# Patient Record
Sex: FEMALE | Race: BLACK OR AFRICAN AMERICAN | NOT HISPANIC OR LATINO | Employment: OTHER | URBAN - METROPOLITAN AREA
[De-identification: names, ages, dates, MRNs, and addresses within clinical notes are randomized per-mention and may not be internally consistent; named-entity substitution may affect disease eponyms.]

---

## 2023-09-06 LAB
ANION GAP IN SER/PLAS: 14 MMOL/L (ref 10–20)
CALCIUM (MG/DL) IN SER/PLAS: 10.2 MG/DL (ref 8.6–10.6)
CARBON DIOXIDE, TOTAL (MMOL/L) IN SER/PLAS: 25 MMOL/L (ref 21–32)
CHLORIDE (MMOL/L) IN SER/PLAS: 109 MMOL/L (ref 98–107)
CREATININE (MG/DL) IN SER/PLAS: 1.48 MG/DL (ref 0.5–1.05)
ESTIMATED AVERAGE GLUCOSE FOR HBA1C: 171 MG/DL
GFR FEMALE: 34 ML/MIN/1.73M2
GLUCOSE (MG/DL) IN SER/PLAS: 155 MG/DL (ref 74–99)
HEMOGLOBIN A1C/HEMOGLOBIN TOTAL IN BLOOD: 7.6 %
POTASSIUM (MMOL/L) IN SER/PLAS: 4 MMOL/L (ref 3.5–5.3)
SODIUM (MMOL/L) IN SER/PLAS: 144 MMOL/L (ref 136–145)
UREA NITROGEN (MG/DL) IN SER/PLAS: 20 MG/DL (ref 6–23)

## 2024-01-04 PROBLEM — J30.9 ALLERGIC RHINITIS: Status: ACTIVE | Noted: 2024-01-04

## 2024-01-04 PROBLEM — R41.89 COGNITIVE IMPAIRMENT: Status: ACTIVE | Noted: 2024-01-04

## 2024-01-04 PROBLEM — T78.02XA ANAPHYLACTIC SHOCK DUE TO SHELLFISH: Status: ACTIVE | Noted: 2024-01-04

## 2024-01-04 PROBLEM — J06.9 ACUTE UPPER RESPIRATORY INFECTION: Status: ACTIVE | Noted: 2024-01-04

## 2024-01-04 PROBLEM — R63.0 ANOREXIA: Status: ACTIVE | Noted: 2024-01-04

## 2024-01-04 PROBLEM — K59.00 CONSTIPATION: Status: ACTIVE | Noted: 2024-01-04

## 2024-01-04 PROBLEM — H52.10 MYOPIA: Status: ACTIVE | Noted: 2024-01-04

## 2024-01-04 PROBLEM — E11.21 CONTROLLED DIABETES MELLITUS WITH DIABETIC NEPHROPATHY (MULTI): Status: ACTIVE | Noted: 2024-01-04

## 2024-01-04 PROBLEM — H52.209 ASTIGMATISM: Status: ACTIVE | Noted: 2024-01-04

## 2024-01-04 PROBLEM — H52.00 HYPEROPIA: Status: ACTIVE | Noted: 2024-01-04

## 2024-01-04 PROBLEM — N18.30 CKD (CHRONIC KIDNEY DISEASE), STAGE III (MULTI): Status: ACTIVE | Noted: 2024-01-04

## 2024-01-04 PROBLEM — F41.9 ANXIETY DISORDER: Status: ACTIVE | Noted: 2024-01-04

## 2024-01-04 PROBLEM — H91.93 BILATERAL HEARING LOSS: Status: ACTIVE | Noted: 2024-01-04

## 2024-01-04 RX ORDER — ACETAMINOPHEN 500 MG
TABLET ORAL 2 TIMES DAILY
COMMUNITY
Start: 2017-06-20

## 2024-01-04 RX ORDER — ATORVASTATIN CALCIUM 10 MG/1
1 TABLET, FILM COATED ORAL DAILY
COMMUNITY
Start: 2014-07-15 | End: 2024-01-22

## 2024-01-04 RX ORDER — GLIPIZIDE 5 MG/1
TABLET ORAL
COMMUNITY
Start: 2016-10-19

## 2024-01-04 RX ORDER — ASCORBIC ACID 500 MG
2000 TABLET ORAL DAILY
COMMUNITY
Start: 2020-09-18

## 2024-01-04 RX ORDER — CLOPIDOGREL BISULFATE 75 MG/1
1 TABLET ORAL DAILY
COMMUNITY
Start: 2014-07-15 | End: 2024-06-03 | Stop reason: SDUPTHER

## 2024-01-04 RX ORDER — EPINEPHRINE 0.3 MG/.3ML
0.3 INJECTION SUBCUTANEOUS
COMMUNITY
Start: 2022-02-09 | End: 2024-01-10 | Stop reason: SDUPTHER

## 2024-01-04 RX ORDER — TURMERIC 400 MG
1 CAPSULE ORAL DAILY
COMMUNITY
Start: 2020-09-18

## 2024-01-04 RX ORDER — VIT C/E/ZN/COPPR/LUTEIN/ZEAXAN 250MG-90MG
50 CAPSULE ORAL DAILY
COMMUNITY
Start: 2014-05-06

## 2024-01-04 RX ORDER — TRAMADOL HYDROCHLORIDE 50 MG/1
1 TABLET ORAL EVERY 8 HOURS PRN
COMMUNITY
Start: 2021-04-30 | End: 2024-01-10 | Stop reason: WASHOUT

## 2024-01-04 RX ORDER — AMLODIPINE BESYLATE 2.5 MG/1
TABLET ORAL
COMMUNITY
Start: 2015-08-04 | End: 2024-01-10 | Stop reason: WASHOUT

## 2024-01-10 ENCOUNTER — OFFICE VISIT (OUTPATIENT)
Dept: GERIATRIC MEDICINE | Facility: CLINIC | Age: 84
End: 2024-01-10
Payer: MEDICARE

## 2024-01-10 VITALS
RESPIRATION RATE: 16 BRPM | OXYGEN SATURATION: 99 % | DIASTOLIC BLOOD PRESSURE: 69 MMHG | SYSTOLIC BLOOD PRESSURE: 112 MMHG | WEIGHT: 135 LBS | HEART RATE: 108 BPM | BODY MASS INDEX: 23.17 KG/M2

## 2024-01-10 DIAGNOSIS — E11.22 TYPE 2 DIABETES MELLITUS WITH STAGE 3B CHRONIC KIDNEY DISEASE, WITHOUT LONG-TERM CURRENT USE OF INSULIN (MULTI): Primary | ICD-10-CM

## 2024-01-10 DIAGNOSIS — I10 PRIMARY HYPERTENSION: ICD-10-CM

## 2024-01-10 DIAGNOSIS — E55.9 VITAMIN D INSUFFICIENCY: ICD-10-CM

## 2024-01-10 DIAGNOSIS — G47.00 INSOMNIA, UNSPECIFIED TYPE: ICD-10-CM

## 2024-01-10 DIAGNOSIS — Z87.892 HISTORY OF ANAPHYLAXIS: ICD-10-CM

## 2024-01-10 DIAGNOSIS — E78.2 MIXED HYPERLIPIDEMIA: ICD-10-CM

## 2024-01-10 DIAGNOSIS — Z86.73 HISTORY OF STROKE: ICD-10-CM

## 2024-01-10 DIAGNOSIS — N18.32 TYPE 2 DIABETES MELLITUS WITH STAGE 3B CHRONIC KIDNEY DISEASE, WITHOUT LONG-TERM CURRENT USE OF INSULIN (MULTI): Primary | ICD-10-CM

## 2024-01-10 DIAGNOSIS — M15.9 PRIMARY OSTEOARTHRITIS INVOLVING MULTIPLE JOINTS: ICD-10-CM

## 2024-01-10 PROBLEM — J06.9 ACUTE UPPER RESPIRATORY INFECTION: Status: RESOLVED | Noted: 2024-01-04 | Resolved: 2024-01-10

## 2024-01-10 PROBLEM — K59.00 CONSTIPATION: Status: RESOLVED | Noted: 2024-01-04 | Resolved: 2024-01-10

## 2024-01-10 LAB
25(OH)D3 SERPL-MCNC: 31 NG/ML (ref 30–100)
ANION GAP SERPL CALC-SCNC: 14 MMOL/L (ref 10–20)
BUN SERPL-MCNC: 15 MG/DL (ref 6–23)
CALCIUM SERPL-MCNC: 10.2 MG/DL (ref 8.6–10.6)
CHLORIDE SERPL-SCNC: 106 MMOL/L (ref 98–107)
CO2 SERPL-SCNC: 26 MMOL/L (ref 21–32)
CREAT SERPL-MCNC: 1.49 MG/DL (ref 0.5–1.05)
EGFRCR SERPLBLD CKD-EPI 2021: 34 ML/MIN/1.73M*2
EST. AVERAGE GLUCOSE BLD GHB EST-MCNC: 171 MG/DL
GLUCOSE SERPL-MCNC: 175 MG/DL (ref 74–99)
HBA1C MFR BLD: 7.6 %
POTASSIUM SERPL-SCNC: 4 MMOL/L (ref 3.5–5.3)
SODIUM SERPL-SCNC: 142 MMOL/L (ref 136–145)

## 2024-01-10 PROCEDURE — 83036 HEMOGLOBIN GLYCOSYLATED A1C: CPT | Performed by: NURSE PRACTITIONER

## 2024-01-10 PROCEDURE — 1159F MED LIST DOCD IN RCRD: CPT | Performed by: NURSE PRACTITIONER

## 2024-01-10 PROCEDURE — 1160F RVW MEDS BY RX/DR IN RCRD: CPT | Performed by: NURSE PRACTITIONER

## 2024-01-10 PROCEDURE — 36415 COLL VENOUS BLD VENIPUNCTURE: CPT | Performed by: NURSE PRACTITIONER

## 2024-01-10 PROCEDURE — 82306 VITAMIN D 25 HYDROXY: CPT | Performed by: NURSE PRACTITIONER

## 2024-01-10 PROCEDURE — 3078F DIAST BP <80 MM HG: CPT | Performed by: NURSE PRACTITIONER

## 2024-01-10 PROCEDURE — 1036F TOBACCO NON-USER: CPT | Performed by: NURSE PRACTITIONER

## 2024-01-10 PROCEDURE — 99215 OFFICE O/P EST HI 40 MIN: CPT | Performed by: NURSE PRACTITIONER

## 2024-01-10 PROCEDURE — 3074F SYST BP LT 130 MM HG: CPT | Performed by: NURSE PRACTITIONER

## 2024-01-10 PROCEDURE — 80048 BASIC METABOLIC PNL TOTAL CA: CPT | Performed by: NURSE PRACTITIONER

## 2024-01-10 PROCEDURE — 1126F AMNT PAIN NOTED NONE PRSNT: CPT | Performed by: NURSE PRACTITIONER

## 2024-01-10 RX ORDER — EPINEPHRINE 0.3 MG/.3ML
0.3 INJECTION SUBCUTANEOUS ONCE AS NEEDED
Qty: 1 EACH | Refills: 3 | Status: SHIPPED | OUTPATIENT
Start: 2024-01-10 | End: 2024-07-08

## 2024-01-10 RX ORDER — BROMELAINS/MELATONIN/HERBAL233 20-1.5-22
1 TABLET,CHEWABLE ORAL NIGHTLY
COMMUNITY
Start: 2023-06-07

## 2024-01-10 ASSESSMENT — ENCOUNTER SYMPTOMS
PALPITATIONS: 0
DEPRESSION: 0
OCCASIONAL FEELINGS OF UNSTEADINESS: 0
DYSURIA: 0
UNEXPECTED WEIGHT CHANGE: 1
TROUBLE SWALLOWING: 0
LOSS OF SENSATION IN FEET: 1
ABDOMINAL PAIN: 0
SLEEP DISTURBANCE: 0
CONSTIPATION: 1
BACK PAIN: 0
ACTIVITY CHANGE: 1
COUGH: 0
WHEEZING: 0
HEADACHES: 0
LIGHT-HEADEDNESS: 0
ARTHRALGIAS: 1
BLOOD IN STOOL: 0
DIZZINESS: 0
DYSPHORIC MOOD: 0
WEAKNESS: 0
SHORTNESS OF BREATH: 0
NERVOUS/ANXIOUS: 0

## 2024-01-10 ASSESSMENT — PAIN SCALES - GENERAL: PAINLEVEL: 0-NO PAIN

## 2024-01-10 ASSESSMENT — PATIENT HEALTH QUESTIONNAIRE - PHQ9
SUM OF ALL RESPONSES TO PHQ9 QUESTIONS 1 AND 2: 0
2. FEELING DOWN, DEPRESSED OR HOPELESS: NOT AT ALL
1. LITTLE INTEREST OR PLEASURE IN DOING THINGS: NOT AT ALL

## 2024-01-10 NOTE — PROGRESS NOTES
Subjective   Ms. Lance is 83 y.o. year old female and here for f/u of   Chief Complaint   Patient presents with    Follow-up    Dm, ckd, htn, hld    Here with self.    Last visit 906  Per pt discussion/summary: note reviewed      HPI   Per patient   - OA really acting up, L knee has been bad, takes tylenol and increases to q8 hours and it helps. When pain is bad, she loses appetite and does not sleep, she thinks she lost wt but not per our records. Declines possible surgery or injections  - toe wound healed now seen by podiatry q3 months, has toe cover but it irritates her toe as does toe separator, uses cotton pad to protect instead.   - BG not tested at home, does not like to test as it brings back bad memories of her son who  from DM  - Wears ID for DM now       Home environment: apt setting in  housing, Mother Deidra Armendariz   Alcohol:No  Smoking:former, quit 13 yrs ago, 50 pack years  Is dependent or requires assistance in the following BADL: none  Is dependent or requires assistance in the following Instrumental ADL: none, not driving, uses community bus for transport and also family     History of Abuse/Neglect/exploitation: none    Advanced Directives on file: has both poa and LW    Review of Systems   Constitutional:  Positive for activity change (due to OA) and unexpected weight change.   HENT:  Negative for dental problem and trouble swallowing.    Respiratory:  Negative for cough, shortness of breath and wheezing.    Cardiovascular:  Negative for chest pain, palpitations and leg swelling.   Gastrointestinal:  Positive for constipation (improving per pt no longer needs miralax or prune juice). Negative for abdominal pain and blood in stool.   Genitourinary:  Negative for dysuria.   Musculoskeletal:  Positive for arthralgias. Negative for back pain (hx of sciatica but stable now).   Skin: Negative.    Neurological:  Negative for dizziness, weakness, light-headedness and headaches.    Psychiatric/Behavioral:  Negative for dysphoric mood and sleep disturbance. The patient is not nervous/anxious.        Objective   /69   Pulse 108   Resp 16   Wt 61.2 kg (135 lb)   SpO2 99%   BMI 23.17 kg/m²   No visits with results within 3 Month(s) from this visit.   Latest known visit with results is:   Orders Only on 09/06/2023   Component Date Value Ref Range Status    Glucose 09/06/2023 155 (H)  74 - 99 mg/dL Final    Sodium 09/06/2023 144  136 - 145 mmol/L Final    Potassium 09/06/2023 4.0  3.5 - 5.3 mmol/L Final    Chloride 09/06/2023 109 (H)  98 - 107 mmol/L Final    Bicarbonate 09/06/2023 25  21 - 32 mmol/L Final    Anion Gap 09/06/2023 14  10 - 20 mmol/L Final    Urea Nitrogen 09/06/2023 20  6 - 23 mg/dL Final    Creatinine 09/06/2023 1.48 (H)  0.50 - 1.05 mg/dL Final    GFR Female 09/06/2023 34 (A)  >90 mL/min/1.73m2 Final    Calcium 09/06/2023 10.2  8.6 - 10.6 mg/dL Final    Hemoglobin A1C 09/06/2023 7.6 (A)  % Final    Estimated Average Glucose 09/06/2023 171  MG/DL Final         Physical Exam  Vitals reviewed.   Constitutional:       Appearance: Normal appearance.      Comments: NAD   HENT:      Head: Normocephalic and atraumatic.      Comments: Hearing adequate for conversation     Mouth/Throat:      Mouth: Mucous membranes are moist.      Pharynx: Oropharynx is clear.   Eyes:      Conjunctiva/sclera: Conjunctivae normal.   Cardiovascular:      Rate and Rhythm: Normal rate and regular rhythm.      Pulses: Normal pulses.      Heart sounds: Normal heart sounds.   Pulmonary:      Effort: Pulmonary effort is normal.      Breath sounds: Normal breath sounds.   Abdominal:      General: Abdomen is flat. Bowel sounds are normal.      Palpations: Abdomen is soft.   Musculoskeletal:         General: Normal range of motion.   Skin:     General: Skin is warm and dry.      Capillary Refill: Capillary refill takes less than 2 seconds.   Neurological:      General: No focal deficit present.      Mental  Status: She is alert and oriented to person, place, and time.   Psychiatric:         Mood and Affect: Mood normal.       Assessment/Plan   1. Type 2 diabetes mellitus with stage 3b chronic kidney disease, without long-term current use of insulin (CMS/[facility])  - Hemoglobin A1C;  - Basic Metabolic Panel  - pt declines to check BG in home as it brings up bad memories of her son    2. Primary hypertension  - stable   - Basic Metabolic Panel; Future    3. History of stroke  - statin and aspirin, plavix continued    4. Mixed hyperlipidemia  - on statin, check lipids next visit     5. Primary osteoarthritis involving multiple joints  - discussed use of tylenol, lido patches and voltaren gel     6. Insomnia, unspecified type  - improved on midnite    7. Vitamin D insufficiency  - Vitamin D 25-Hydroxy,Total (for eval of Vitamin D levels)    8. History of anaphylaxis  - cannot get vaccinations due to hx  - EPINEPHrine 0.3 mg/0.3 mL injection syringe; Inject 0.3 mL (0.3 mg) into the muscle 1 time if needed for anaphylaxis.     9. HM  - cannot do vaccinations  - age out colonoscopy, last randa 2018  - AWV due next appt     Dispo: fu 3 mos for awv and lipid panel

## 2024-01-10 NOTE — PATIENT INSTRUCTIONS
Thank you for meeting with me today. We discussed the following:     Knee pain  - see general pain info below    Diabetes  - we will check your A1c today and see how it is, I would be happy with anything 7 or less.     In addition, here are some general guidelines on brain health, pain control and sleep.   General brain health guidelines:  - Make sure your medical conditions are well controlled (e.g., high blood pressure, high cholesterol, diabetes, sleep apnea etc)  - Do not smoke or chew tobacco  - Limiit alcohol use to no more than 1 alcoholic beverage per day   - Address any sensory deficits (e.g., proper glasses for poor eyesight, hearing aids for hearing loss)  - Use a weekly pill box  - Eat a heart healthy diet (fruits, vegetables, lean meats, fatty fish, whole grains. Limit processed foods)  - Exercise or walk. Gradually increase to the goal of 5 days per week, 30 minutes at a time  - Try to get at least 7 hours of quality sleep per night  - Keep yourself mentally active daily by reading, playing cards, doing word searches, puzzles, etc.  - Challenge your brain with new cognitive tasks (new hobby, crafts, take a class, learn a language)  - Stay socially active by being part of a group or organization    General pain control guidelines  - try to stay ahead of pain by taking your medications sooner rather than later  - Tyelnol is generally a safe medication to take for pain. A general dose is 1000 mg every 6-8 hours; do not exceed 3000 mg or 3 doses in a day. Stay away from formulations that have Benadryl or diphenhydramine in them.   - Lidocaine gel or patch may help to relieve pain. 4% strength is over the counter.  - Voltaren gel 1% may be helpful in relieving pain. It is available over the counter.   - avoid Nonsteroidal Anti-inflammatories (Nsaids) such as Motrin (ibuprofen), Aleve (naproxen) unless specifically recommended by your provider;  these can cause gastrointestinal and kidney problems.   - Use  heat or cold as needed to help with pain.   - Distraction can be helpful.     General sleep guidelines  - Avoid over the counter sleep preparations with diphenhydramine or Benadryl. This medicine can cause confusion and increase risk of falls.   - Do not use alcohol to go to sleep.   - Melatonin is generally safe to try, start with 1-3 mg a couple of hours before sleep.   - Avoid all caffeine after 12 noon. Look for hidden sources such as chocolate.   - Try an herbal tea like chamomile or Sleepytime before bed.  - Turn off the TV or set a timer so it goes off.   - Establish a bedtime routine to tell your body it is time to sleep. It can be some relaxing music, reading a book, taking a shower, prayer/meditation, etc.     Please follow up with us in 3 months for annual wellness visit and regular follow up. We will do a lipid panel that day, so fast   If the weather is bad on the day of your next appointment, it can be changed to a virtual visit. Please call the office on the day of the visit and ask them to change it to a virtual visit.

## 2024-01-20 DIAGNOSIS — E78.5 HYPERLIPIDEMIA, UNSPECIFIED HYPERLIPIDEMIA TYPE: Primary | ICD-10-CM

## 2024-01-22 RX ORDER — ATORVASTATIN CALCIUM 10 MG/1
10 TABLET, FILM COATED ORAL DAILY
Qty: 100 TABLET | Refills: 2 | Status: SHIPPED | OUTPATIENT
Start: 2024-01-22

## 2024-04-10 ENCOUNTER — APPOINTMENT (OUTPATIENT)
Dept: GERIATRIC MEDICINE | Facility: CLINIC | Age: 84
End: 2024-04-10
Payer: MEDICARE

## 2024-04-10 ENCOUNTER — OFFICE VISIT (OUTPATIENT)
Dept: GERIATRIC MEDICINE | Facility: CLINIC | Age: 84
End: 2024-04-10
Payer: MEDICARE

## 2024-04-10 VITALS
HEART RATE: 83 BPM | DIASTOLIC BLOOD PRESSURE: 76 MMHG | WEIGHT: 133.9 LBS | SYSTOLIC BLOOD PRESSURE: 134 MMHG | BODY MASS INDEX: 22.98 KG/M2 | OXYGEN SATURATION: 98 %

## 2024-04-10 DIAGNOSIS — E78.2 MIXED HYPERLIPIDEMIA: ICD-10-CM

## 2024-04-10 DIAGNOSIS — Z86.73 HISTORY OF STROKE: ICD-10-CM

## 2024-04-10 DIAGNOSIS — M15.9 PRIMARY OSTEOARTHRITIS INVOLVING MULTIPLE JOINTS: ICD-10-CM

## 2024-04-10 DIAGNOSIS — I10 PRIMARY HYPERTENSION: ICD-10-CM

## 2024-04-10 DIAGNOSIS — G47.00 INSOMNIA, UNSPECIFIED TYPE: ICD-10-CM

## 2024-04-10 DIAGNOSIS — E11.22 TYPE 2 DIABETES MELLITUS WITH STAGE 3B CHRONIC KIDNEY DISEASE, WITHOUT LONG-TERM CURRENT USE OF INSULIN (MULTI): Primary | ICD-10-CM

## 2024-04-10 DIAGNOSIS — E55.9 VITAMIN D INSUFFICIENCY: ICD-10-CM

## 2024-04-10 DIAGNOSIS — N18.32 TYPE 2 DIABETES MELLITUS WITH STAGE 3B CHRONIC KIDNEY DISEASE, WITHOUT LONG-TERM CURRENT USE OF INSULIN (MULTI): Primary | ICD-10-CM

## 2024-04-10 LAB
CHOLEST SERPL-MCNC: 124 MG/DL (ref 0–199)
CHOLESTEROL/HDL RATIO: 2.2
HDLC SERPL-MCNC: 57.4 MG/DL
LDLC SERPL CALC-MCNC: 51 MG/DL
NON HDL CHOLESTEROL: 67 MG/DL (ref 0–149)
TRIGL SERPL-MCNC: 78 MG/DL (ref 0–149)
VLDL: 16 MG/DL (ref 0–40)

## 2024-04-10 PROCEDURE — 99214 OFFICE O/P EST MOD 30 MIN: CPT | Performed by: NURSE PRACTITIONER

## 2024-04-10 PROCEDURE — 1159F MED LIST DOCD IN RCRD: CPT | Performed by: NURSE PRACTITIONER

## 2024-04-10 PROCEDURE — 99215 OFFICE O/P EST HI 40 MIN: CPT | Performed by: NURSE PRACTITIONER

## 2024-04-10 PROCEDURE — 1170F FXNL STATUS ASSESSED: CPT | Performed by: NURSE PRACTITIONER

## 2024-04-10 PROCEDURE — 1160F RVW MEDS BY RX/DR IN RCRD: CPT | Performed by: NURSE PRACTITIONER

## 2024-04-10 PROCEDURE — 36415 COLL VENOUS BLD VENIPUNCTURE: CPT | Performed by: NURSE PRACTITIONER

## 2024-04-10 PROCEDURE — G0439 PPPS, SUBSEQ VISIT: HCPCS | Performed by: NURSE PRACTITIONER

## 2024-04-10 PROCEDURE — 80061 LIPID PANEL: CPT | Performed by: NURSE PRACTITIONER

## 2024-04-10 PROCEDURE — 3075F SYST BP GE 130 - 139MM HG: CPT | Performed by: NURSE PRACTITIONER

## 2024-04-10 PROCEDURE — 3078F DIAST BP <80 MM HG: CPT | Performed by: NURSE PRACTITIONER

## 2024-04-10 ASSESSMENT — ENCOUNTER SYMPTOMS
COUGH: 0
SLEEP DISTURBANCE: 1
ABDOMINAL PAIN: 0
HEMATURIA: 0
BLOOD IN STOOL: 0
SINUS PAIN: 0
WHEEZING: 0
WEAKNESS: 1
ARTHRALGIAS: 1
DIFFICULTY URINATING: 0
LIGHT-HEADEDNESS: 0
POLYPHAGIA: 0
CHILLS: 0
FREQUENCY: 0
HEADACHES: 0
SINUS PRESSURE: 0
DIZZINESS: 0
CONSTIPATION: 0
OCCASIONAL FEELINGS OF UNSTEADINESS: 0
DYSPHORIC MOOD: 0
FATIGUE: 0
APPETITE CHANGE: 1
FEVER: 0
PALPITATIONS: 0
NUMBNESS: 1
SHORTNESS OF BREATH: 0
TROUBLE SWALLOWING: 0
BACK PAIN: 1
DYSURIA: 0
DEPRESSION: 0
NERVOUS/ANXIOUS: 0
ANAL BLEEDING: 0
LOSS OF SENSATION IN FEET: 0

## 2024-04-10 ASSESSMENT — MONTREAL COGNITIVE ASSESSMENT (MOCA)
8. SERIAL SUBTRACTION OF 7S: 3
WHAT LEVEL OF EDUCATION WAS ATTAINED: 1
12. MEMORY INDEX SCORE: 5
7. [VIGILENCE] TAP WHEN HEARING DESIGNATED LETTER: 1
WHAT IS THE TOTAL SCORE (OUT OF 30): 25
11. FOR EACH PAIR OF WORDS, WHAT CATEGORY DO THEY BELONG TO (OUT OF 2): 2
9. REPEAT EACH SENTENCE: 0
4. NAME EACH OF THE THREE ANIMALS SHOWN: 3
5. MEMORY TRIALS: 0
6. READ LIST OF DIGITS [FORWARD/BACKWARD]: 2
VISUOSPATIAL/EXECUTIVE SUBSCORE: 2
13. ORIENTATION SUBSCORE: 6
10. [FLUENCY] NAME WORDS STARTING WITH DESIGNATED LETTER: 0

## 2024-04-10 ASSESSMENT — ACTIVITIES OF DAILY LIVING (ADL)
HEARING - RIGHT EAR: FUNCTIONAL
STILL_DRIVING: NO
TAKING_MEDICATION: INDEPENDENT
FEEDING YOURSELF: INDEPENDENT
GROOMING: INDEPENDENT
MANAGING_FINANCES: INDEPENDENT
PILL_BOX_USED: YES
DRESSING YOURSELF: INDEPENDENT
NEEDS_ASSISTANCE_WITH_FOOD: INDEPENDENT
USING_TELEPHONE: INDEPENDENT
TOILETING: INDEPENDENT
GROCERY_SHOPPING: INDEPENDENT
JUDGMENT_ADEQUATE_SAFELY_COMPLETE_DAILY_ACTIVITIES: YES
EATING: INDEPENDENT
DOING_HOUSEWORK: INDEPENDENT
HEARING - LEFT EAR: FUNCTIONAL
PATIENT'S MEMORY ADEQUATE TO SAFELY COMPLETE DAILY ACTIVITIES?: YES
WALKS IN HOME: INDEPENDENT
USING_TRANSPORTATION: INDEPENDENT
PREPARING_MEALS: INDEPENDENT
ADEQUATE_TO_COMPLETE_ADL: YES
BATHING: INDEPENDENT

## 2024-04-10 ASSESSMENT — PAIN - FUNCTIONAL ASSESSMENT: PAIN_FUNCTIONAL_ASSESSMENT: 0-10

## 2024-04-10 ASSESSMENT — PATIENT HEALTH QUESTIONNAIRE - PHQ9
SUM OF ALL RESPONSES TO PHQ9 QUESTIONS 1 AND 2: 0
1. LITTLE INTEREST OR PLEASURE IN DOING THINGS: NOT AT ALL
SUM OF ALL RESPONSES TO PHQ9 QUESTIONS 1 AND 2: 0
2. FEELING DOWN, DEPRESSED OR HOPELESS: NOT AT ALL
1. LITTLE INTEREST OR PLEASURE IN DOING THINGS: NOT AT ALL
2. FEELING DOWN, DEPRESSED OR HOPELESS: NOT AT ALL

## 2024-04-10 ASSESSMENT — PAIN SCALES - GENERAL: PAINLEVEL_OUTOF10: 0 - NO PAIN

## 2024-04-10 NOTE — PATIENT INSTRUCTIONS
Thank you for meeting with me today. We discussed the following:     Arthritis  - take 2 tylenol in the morning and 2 at night, take another dose midday if needed. See info below.   - Check into an online guzman chi program. I put in for a PT ven[place], call [phone] and ask for outpatient therapy scheduling.  - go back on the tumeric to help with inflammation.    If appetite is low, try to make sure to get in protein so you don't lose muscle mass.    We did your annual wellness visit  today  What Is a Medicare Annual Wellness Visit?  During your Medicare Annual Wellness visit (AWV), your health care provider will go over your health risks, prevention measures and screenings to make sure all your immunizations, cancer screenings and other health screenings are discussed and scheduled. Your physician will conduct a health risk assessment to help optimize your health and prevent future disease. In addition, the visit gives you an opportunity to ask questions and obtain in-depth information on your health. An AWV is not a traditional physical exam but a time to make sure you are up to date on cancer screening and preventable health conditions, immunizations and any personal health risks.  What Is Included in Your AWV  Health Risk Assessment-Review your blood pressure, heart rate, height, weight and body mass index (BMI)  Patient History Review-Review your current health problems, as well as your medical, surgical, family and social histories.  Review of Current Health Providers-Establish or update a list of your current health care providers.  Medication Review-Review and document current medications, supplements and any medical equipment you use.  Screening & Shots Review-Discuss and schedule preventive screenings and shots recommended for you based on your age, risk factors and family history.  Patient Education-Provide education and referrals based on other components of the visit.  Fall Prevention-Review your functional  ability and level of safety.  End of Life Planning-Discuss wishes and treatment preferences for care if you are unable to make those decisions in the future.  How much does it cost?  You pay nothing for this visit if your doctor or other health care provider accepts assignment.The Part B deductible doesn’t apply. However, you may have to pay coinsurance, and the Part B deductible may apply if your doctor or other health care provider performs additional tests or services during the same visit that Medicare doesn't cover under this preventive benefit.      In addition, here are some general guidelines on brain health, pain control and sleep.   General brain health guidelines:  - Make sure your medical conditions are well controlled (e.g., high blood pressure, high cholesterol, diabetes, sleep apnea etc)  - Do not smoke or chew tobacco  - Limiit alcohol use to no more than 1 alcoholic beverage per day   - Address any sensory deficits (e.g., proper glasses for poor eyesight, hearing aids for hearing loss)  - Use a weekly pill box  - Eat a heart healthy diet (fruits, vegetables, lean meats, fatty fish, whole grains. Limit processed foods)  - Exercise or walk. Gradually increase to the goal of 5 days per week, 30 minutes at a time  - Try to get at least 7 hours of quality sleep per night  - Keep yourself mentally active daily by reading, playing cards, doing word searches, puzzles, etc.  - Challenge your brain with new cognitive tasks (new hobby, crafts, take a class, learn a language)  - Stay socially active by being part of a group or organization    General pain control guidelines  - try to stay ahead of pain by taking your medications sooner rather than later  - Tyelnol is generally a safe medication to take for pain. A general dose is 1000 mg every 6-8 hours; do not exceed 3000 mg or 3 doses in a day. Stay away from formulations that have Benadryl or diphenhydramine in them.   - Lidocaine gel or patch may help to  relieve pain. 4% strength is over the counter.  - Voltaren gel 1% may be helpful in relieving pain. It is available over the counter.   - avoid Nonsteroidal Anti-inflammatories (Nsaids) such as Motrin (ibuprofen), Aleve (naproxen) unless specifically recommended by your provider;  these can cause gastrointestinal and kidney problems.   - Use heat or cold as needed to help with pain.   - Distraction can be helpful.     General sleep guidelines  - Avoid over the counter sleep preparations with diphenhydramine or Benadryl. This medicine can cause confusion and increase risk of falls.   - Do not use alcohol to go to sleep.   - Melatonin is generally safe to try, start with 1-3 mg a couple of hours before sleep.   - Avoid all caffeine after 12 noon. Look for hidden sources such as chocolate.   - Try an herbal tea like chamomile or Sleepytime before bed.  - Turn off the TV or set a timer so it goes off.   - Establish a bedtime routine to tell your body it is time to sleep. It can be some relaxing music, reading a book, taking a shower, prayer/meditation, etc.       Please follow up with us in return to clinic: 3-4 months  I will call you about lab work and the plan.,

## 2024-04-10 NOTE — PROGRESS NOTES
Subjective 1133  Ms. Lance is 83 y.o. year old female and here for f/u of   Chief Complaint   Patient presents with    Follow-up    AWV and  fu DM, htn, hld     Here with self.    Last visit  Per pt discussion/summary: note reviewed    Interim events: none    HPI   Per patient   - OA bothering her,  L knee, Rl eg calf area (steady pain to heel, sharp pain, wakes her sometime) , R shoulder (similar toR leg pain and also travels to L side) hurts with rainy weather, takes hot showers, rubs muscle rub, not sure what is, also tylenol   - pain affecting appetite, drinks sufficient fluids  - cannot do the immunizations due to hx of angioedema with some meds, has been advised not to try immunizations.    Home environment: Sr. Housing apt Mother Ryann Armendariz   Alcohol:No  Smoking:the patient is a former smoker who quit smoking on 13 yrs ago  Is dependent or requires assistance in the following BADL: see screen   Is dependent or requires assistance in the following Instrumental ADL:see screen     History of Abuse/Neglect/exploitation: none    Advanced Directives on file: has both poa and lw    Review of Systems   Constitutional:  Positive for appetite change (pain lessens appetite). Negative for chills, fatigue and fever.   HENT:  Positive for hearing loss. Negative for dental problem, postnasal drip, sinus pressure, sinus pain and trouble swallowing.    Eyes:  Negative for visual disturbance.   Respiratory:  Negative for cough, shortness of breath and wheezing.    Cardiovascular:  Negative for chest pain, palpitations and leg swelling.   Gastrointestinal:  Negative for abdominal pain, anal bleeding, blood in stool and constipation (hx of issues but now resolved).   Endocrine: Negative for polyphagia and polyuria.   Genitourinary:  Negative for difficulty urinating, dysuria, frequency, hematuria, urgency and vaginal bleeding.   Musculoskeletal:  Positive for arthralgias and back pain (hx of sciatica).   Neurological:  Positive  for weakness (more trouble opening cans with R hand) and numbness (R hand numbness and seldom in L). Negative for dizziness, light-headedness and headaches.   Psychiatric/Behavioral:  Positive for sleep disturbance (taking midnite and is sleeping better). Negative for dysphoric mood. The patient is not nervous/anxious.        Objective   /76   Pulse 83   Wt 60.7 kg (133 lb 14.4 oz)   SpO2 98%   BMI 22.98 kg/m²   No visits with results within 3 Month(s) from this visit.   Latest known visit with results is:   Office Visit on 01/10/2024   Component Date Value Ref Range Status    Hemoglobin A1C 01/10/2024 7.6 (H)  see below % Final    Estimated Average Glucose 01/10/2024 171  Not Established mg/dL Final    Glucose 01/10/2024 175 (H)  74 - 99 mg/dL Final    Sodium 01/10/2024 142  136 - 145 mmol/L Final    Potassium 01/10/2024 4.0  3.5 - 5.3 mmol/L Final    Chloride 01/10/2024 106  98 - 107 mmol/L Final    Bicarbonate 01/10/2024 26  21 - 32 mmol/L Final    Anion Gap 01/10/2024 14  10 - 20 mmol/L Final    Urea Nitrogen 01/10/2024 15  6 - 23 mg/dL Final    Creatinine 01/10/2024 1.49 (H)  0.50 - 1.05 mg/dL Final    eGFR 01/10/2024 34 (L)  >60 mL/min/1.73m*2 Final    Calcium 01/10/2024 10.2  8.6 - 10.6 mg/dL Final    Vitamin D, 25-Hydroxy, Total 01/10/2024 31  30 - 100 ng/mL Final         Physical Exam  Constitutional:       General: She is not in acute distress.  HENT:      Mouth/Throat:      Mouth: Mucous membranes are moist.      Pharynx: Oropharynx is clear.   Cardiovascular:      Rate and Rhythm: Regular rhythm.   Pulmonary:      Effort: Pulmonary effort is normal.      Breath sounds: Normal breath sounds.   Abdominal:      General: Bowel sounds are normal.      Palpations: Abdomen is soft.   Musculoskeletal:         General: Normal range of motion.      Cervical back: Normal range of motion and neck supple.      Comments: Neg homans Rcalf,no cordor swellingn    Skin:     General: Skin is warm and dry.       Capillary Refill: Capillary refill takes less than 2 seconds.   Neurological:      Mental Status: She is alert. Mental status is at baseline.   Psychiatric:         Mood and Affect: Mood normal.         Assessment/Plan    1. Type 2 diabetes mellitus with stage 3b chronic kidney disease, without long-term current use of insulin (CMS/[facility])  - last A1c 7.6, pt on glipizide only, not on metformin due to ckd  - does not check BG in home as it brings up bad memories of son   - A1c today, plan to do q3 mos due to not checking at home  - needs additional agent with low potential for hypoglycemia    2. Primary hypertension  3. History of stroke  4. Mixed hyperlipidemia  - on statin and plavix   - last lipid panel 2020, will recheck   - amlodipine     5. Primary osteoarthritis involving multiple joints  - increase tylenol use   - PT eval and fu   - wants to use turmeric     6. Insomnia, unspecified type  - stable no meds     7. Vitamin D insufficiency  - supps      8. HM  - labs needed lipid, A1c  - declines imunizations   - last mammo 2018, opts out of future   - no c scope due to age unless medically indicated     Dispo: will send to therapy for oa pain, fu 3 mos

## 2024-05-06 ENCOUNTER — EVALUATION (OUTPATIENT)
Dept: PHYSICAL THERAPY | Facility: CLINIC | Age: 84
End: 2024-05-06
Payer: MEDICARE

## 2024-05-06 DIAGNOSIS — M15.9 PRIMARY OSTEOARTHRITIS INVOLVING MULTIPLE JOINTS: ICD-10-CM

## 2024-05-06 PROCEDURE — 97161 PT EVAL LOW COMPLEX 20 MIN: CPT | Mod: GP | Performed by: PHYSICAL THERAPIST

## 2024-05-06 PROCEDURE — 97110 THERAPEUTIC EXERCISES: CPT | Mod: GP | Performed by: PHYSICAL THERAPIST

## 2024-05-06 ASSESSMENT — ENCOUNTER SYMPTOMS
LOSS OF SENSATION IN FEET: 0
OCCASIONAL FEELINGS OF UNSTEADINESS: 1
DEPRESSION: 0

## 2024-05-06 NOTE — PROGRESS NOTES
Physical Therapy    Physical Therapy Evaluation and Treatment      Patient Name: Tanya Lance  MRN: [id number]  Today's Date: 5/6/2024  Visit: 1  Insurance: Reviewed  Physician: Lucero Childers/Justine Baumann   PT Evaluation Time Entry  PT Evaluation (Low) Time Entry: 30  PT Therapeutic Procedures Time Entry  Therapeutic Exercise Time Entry: 15  Time Calculation  Start Time: 0845  Stop Time: 0930  Time Calculation (min): 45 min    Assessment: Patient seen in PT for Initial Evaluation for shoulder pain with limited arm mobility, and decreased balance and mobility.   Patient presents with postural deviation  decreased shoulder ROM , decreased shoulder and le strength, shoulder and knee tenderness, gait deviation, and balance deviation.  Functionally, patient  unable to do strenuous ADLs', and decreased endurance to 15 minutes walking.  Patient rates LEFS at 41.25%.    PT Assessment  Rehab Prognosis: Good     Plan:  Continue with POC  Shoulder pulley, shoulder ROM with wand, shoulder strength with theraband, and leg strength, balance and GT, and modalities for pain.    OP PT Plan  PT Plan: Skilled PT  PT Frequency: 1 time per week  Duration: 8  Onset Date: 04/10/25  Certification Period Start Date: 05/06/24  Certification Period End Date: 08/04/24  Rehab Potential: Good  Plan of Care Agreement: Patient    Current Problem:   1. Primary osteoarthritis involving multiple joints  Referral to Physical Therapy    Follow Up In Physical Therapy          Subjective    General: Patient with a history of bilateral arm pain for the last year.  Onset was insidious.  Patient treated with tyelonol - helps.  Patient complains of pain in the region of the bilateral shoulder down lateral arm to forearm, sharp pain, 8/10 at worst last 7 days.  Patient's pain is worse with reaching, lifting arm.  No tests.  Functionally, patient is able to do all ADL's with arm.  Pain also down into right calf, left foot and left knee.  Pain varies with activity.   Patient feels unsteady with gait.  Patient avoids strenuous ADL's.  Limited to 15 minutes walking due to pain due to fatigue.         Precautions: HTN, DM, history of CVA with left leg weakness approx 7 yers ago, fall risk  Precautions  STEADI Fall Risk Score (The score of 4 or more indicates an increased risk of falling): 5  Prior Level of Function: able to do all normal ADL's 10 years ago       Objective     Posture: standing flexed trunk  LE ROM: WNL  LE strength: 4+/5 hip abd, 4+/5 hip add, 4+/5 knee extension, 4/5 knee flexion, 4/5 dorsiflexion, bilaterally   Gait: decreased hs bilaterally  Balance: 10 sec sls on left, 2 sec on right   Postural deviation: FH, rounded shoulders  right Shoulder ROM to 140 flexion, 120 abduction, 70 er, and HBB to TL junction  left Shoulder ROM to 150 flexion, 110 abduction, 70 er, and HBB to T7  right Shoulder strength 3+/5  flex, 3+/5 abduction, 4/5  ER, and 4/5  IR  left Shoulder strength 4/5  flex, 3+/5 abduction, 4/5  ER, and 4/5  IR  Patient tender to palpation at the bilateral UT, left knee and foot    Outcome Measures:  Other Measures  Lower Extremity Funtional Score (LEFS): 41.25%     Treatments:  Patient instructed in HEP including: Codman's exercises 20X each, dips and heel raises 20X.  Instructed in heel to toe with walking, and start walking program.   (15 minutes)    EDUCATION: HEP       Goals:  Active       PT Problem       PT Goal 1       Start:  05/06/24    Expected End:  08/04/24       Normalize gait - good heel strike and toe off         PT Goal 2       Start:  05/06/24    Expected End:  08/04/24       Improve LEFS by 15%         PT Goal 3       Start:  05/06/24    Expected End:  08/04/24       Maximize shoulder ROM          PT Goal 4       Start:  05/06/24    Expected End:  08/04/24       5/5 le strength          PT Goal 5       Start:  05/06/24    Expected End:  08/04/24       SLS 10 sec+

## 2024-05-14 ENCOUNTER — DOCUMENTATION (OUTPATIENT)
Dept: PHYSICAL THERAPY | Facility: CLINIC | Age: 84
End: 2024-05-14
Payer: MEDICARE

## 2024-05-14 ENCOUNTER — APPOINTMENT (OUTPATIENT)
Dept: PHYSICAL THERAPY | Facility: CLINIC | Age: 84
End: 2024-05-14
Payer: MEDICARE

## 2024-05-14 NOTE — PROGRESS NOTES
Physical Therapy                 Therapy Communication Note    Patient Name: Tanya Lance  MRN: [id number]  Today's Date: 5/14/2024     Discipline: Physical Therapy    Missed Visit Reason:  Canceled at 7:51 am, by ES, no reason given.     Missed Time: Cancel    Comment:

## 2024-05-23 RX ORDER — GLIPIZIDE 5 MG/1
5 TABLET ORAL
Refills: 3 | OUTPATIENT
Start: 2024-05-23

## 2024-05-26 DIAGNOSIS — I10 PRIMARY HYPERTENSION: ICD-10-CM

## 2024-05-28 RX ORDER — AMLODIPINE BESYLATE 2.5 MG/1
7.5 TABLET ORAL DAILY
Qty: 270 TABLET | Refills: 3 | Status: SHIPPED | OUTPATIENT
Start: 2024-05-28 | End: 2025-05-28

## 2024-05-31 ENCOUNTER — TREATMENT (OUTPATIENT)
Dept: PHYSICAL THERAPY | Facility: CLINIC | Age: 84
End: 2024-05-31
Payer: MEDICARE

## 2024-05-31 DIAGNOSIS — M15.9 PRIMARY OSTEOARTHRITIS INVOLVING MULTIPLE JOINTS: ICD-10-CM

## 2024-05-31 PROCEDURE — 97110 THERAPEUTIC EXERCISES: CPT | Mod: GP,CQ

## 2024-05-31 ASSESSMENT — PAIN SCALES - GENERAL: PAINLEVEL_OUTOF10: 5 - MODERATE PAIN

## 2024-05-31 ASSESSMENT — PAIN - FUNCTIONAL ASSESSMENT: PAIN_FUNCTIONAL_ASSESSMENT: 0-10

## 2024-05-31 NOTE — PROGRESS NOTES
"Physical Therapy    Physical Therapy Treatment    Patient Name: Tanya Lance  MRN: [id number]  Today's Date: 5/31/2024  Time Calculation  Start Time: 0820  Stop Time: 0915  Time Calculation (min): 55 min     PT Therapeutic Procedures Time Entry  Therapeutic Exercise Time Entry: 55                 Visit 2     Assessment:  PT Assessment  Evaluation/Treatment Tolerance: Patient tolerated treatment well  Positive overall session/response from the patient. Changed from standing calf raise to seated with deficit and weight, no reported issues or pain in the left great toe. Moderate cueing for introduction of resisted strengthening rowing, extensions and bilateral ER; no pains reported. Had soreness during scap/abduction on pulleys and during ball rolls laterally. Fatigue through both legs after 15 repetitions of step ups, no aggravation of symptoms. Leaves today feeling better than when starting.     Plan:   Continue treatment per current POC  Current Problem  1. Primary osteoarthritis involving multiple joints  Follow Up In Physical Therapy      General        Subjective    Mainly having pain through the right shoulder and left knee, reports a pain level around a 5/10 in both. Performing Hep most days; Difficulty when performing calf raises, states that when doing them last she cracked the toenail of the left great toe. No other major updates from the patient currently.     Pain  Pain Assessment  Pain Assessment: 0-10  Pain Score: 5 - Moderate pain    Objective     Treatments: 55 min  Therapeutic Exercise  Therapeutic Exercise Performed: Yes  Recumbent stepper lvl 2.5 x 7 min  Pulley flx/scaption x 5 min  Ball rolls on table fwd/lateral x 20 each  YTB rowing x 20  YTB extensions x 20  Single YTB bilateral ER x 20 (partial rom)  Pendulums PRN for rest breaks (after strengthening)    Seated hip abduction x 20 GT  Seated hip adduction x 20 3 sec hold  6\" step us x 15 each  Lunges on steps x 15 each  Seated calf raise toes " propped on two inch platform 5# db on knees x 30  Through clinic reiterating proper heel toe walking x 3 min    OP EDUCATION:     Goals:  Active       PT Problem       PT Goal 1       Start:  05/06/24    Expected End:  08/04/24       Normalize gait - good heel strike and toe off         PT Goal 2       Start:  05/06/24    Expected End:  08/04/24       Improve LEFS by 15%         PT Goal 3       Start:  05/06/24    Expected End:  08/04/24       Maximize shoulder ROM          PT Goal 4       Start:  05/06/24    Expected End:  08/04/24       5/5 le strength          PT Goal 5       Start:  05/06/24    Expected End:  08/04/24       SLS 10 sec+

## 2024-06-03 DIAGNOSIS — Z86.73 HISTORY OF CVA (CEREBROVASCULAR ACCIDENT): Primary | ICD-10-CM

## 2024-06-03 RX ORDER — CLOPIDOGREL BISULFATE 75 MG/1
75 TABLET ORAL DAILY
Qty: 30 TABLET | Refills: 3 | Status: SHIPPED | OUTPATIENT
Start: 2024-06-03

## 2024-06-04 ENCOUNTER — TREATMENT (OUTPATIENT)
Dept: PHYSICAL THERAPY | Facility: CLINIC | Age: 84
End: 2024-06-04
Payer: MEDICARE

## 2024-06-04 DIAGNOSIS — M15.9 PRIMARY OSTEOARTHRITIS INVOLVING MULTIPLE JOINTS: ICD-10-CM

## 2024-06-04 PROCEDURE — 97110 THERAPEUTIC EXERCISES: CPT | Mod: GP | Performed by: PHYSICAL THERAPIST

## 2024-06-04 NOTE — PROGRESS NOTES
"Physical Therapy    Physical Therapy Treatment    Patient Name: Tanya Lance  MRN: [id number]  Today's Date: 6/4/2024  Visit: 3/8  Insurance: [facility] [employer]  Authorization: 5/6/24-8/6/24     PT Therapeutic Procedures Time Entry  Therapeutic Exercise Time Entry: 40  Time Calculation  Start Time: 0805  Stop Time: 0845  Time Calculation (min): 40 min     PT Therapeutic Procedures Time Entry  Therapeutic Exercise Time Entry: 40                     Assessment: Patient with improved gait with cues for heel to toe walking.  Patient with decreased pain level since starting PT to 3/10.       Plan:   Continue treatment per current POC  Current Problem  1. Primary osteoarthritis involving multiple joints  Follow Up In Physical Therapy      General        Subjective    Knee pain left and right shoulder 3/10 at worst last 2 days  Shoulder pain worse with ROM    Pain       Objective     Normal gait with cues for heel to toe walking    Treatments: 40 min     Recumbent stepper lvl 2.5 x 8 min  Practiced heel to toe gait 20' X 4  Pulley flx/scaption x 5 min  Ball rolls on table fwd/lateral x 20 each  YTB rowing x 20  YTB extensions x 20  Single YTB bilateral ER x 20 (partial rom)  Pendulums PRN for rest breaks (after strengthening)  Mini squats 15X  SLS 10X each  6\" step us x 15 each  Lunges on steps x 20 each  (40 minutes)      OP EDUCATION: HEP      Goals:  Active       PT Problem       PT Goal 1       Start:  05/06/24    Expected End:  08/04/24       Normalize gait - good heel strike and toe off         PT Goal 2       Start:  05/06/24    Expected End:  08/04/24       Improve LEFS by 15%         PT Goal 3       Start:  05/06/24    Expected End:  08/04/24       Maximize shoulder ROM          PT Goal 4       Start:  05/06/24    Expected End:  08/04/24       5/5 le strength          PT Goal 5       Start:  05/06/24    Expected End:  08/04/24       SLS 10 sec+             "

## 2024-06-11 ENCOUNTER — TREATMENT (OUTPATIENT)
Dept: PHYSICAL THERAPY | Facility: CLINIC | Age: 84
End: 2024-06-11
Payer: MEDICARE

## 2024-06-11 DIAGNOSIS — M15.9 PRIMARY OSTEOARTHRITIS INVOLVING MULTIPLE JOINTS: ICD-10-CM

## 2024-06-11 PROCEDURE — 97110 THERAPEUTIC EXERCISES: CPT | Mod: GP | Performed by: PHYSICAL THERAPIST

## 2024-06-11 NOTE — PROGRESS NOTES
"Physical Therapy    Physical Therapy Treatment    Patient Name: Tanya Lance  MRN: [id number]  Today's Date: 6/11/2024  Visit: 4/8  Insurance: [facility] Medicare  Authorization: 5/6/24-8/6/24  PT Therapeutic Procedures Time Entry  Therapeutic Exercise Time Entry: 38  Time Calculation  Start Time: 0845  Stop Time: 0925  Time Calculation (min): 40 min  PT Therapeutic Procedures Time Entry  Therapeutic Exercise Time Entry: 38      Assessment: Patient able to progress exercises without pain or difficulty.        Plan:   Continue treatment per current POC  Current Problem  1. Primary osteoarthritis involving multiple joints  Follow Up In Physical Therapy      General        Subjective    Knee pain left and right shoulder 5/10 at worst last 2 days  Shoulder pain with reaching, knee with sit to stand    Pain       Objective       Treatments: 38 min     Recumbent stepper lvl 2.5 x 7 min  Step stretch 20X  Practiced heel to toe gait 20' X 4  Pulley flx/scaption x 5 min  Ball rolls on table fwd/lateral x 20 each  RTB rowing x 20  RTB extensions x 20  Single YTB bilateral ER x 20 (partial rom)  Mini squats 20X  SLS on blue foam 10X each  6\" step us x 15 each  Supine shoulder press, flies and triceps 2# 10X each  SAQ with 4# 20X   (38 minutes)      OP EDUCATION: HEP      Goals:  Active       PT Problem       PT Goal 1       Start:  05/06/24    Expected End:  08/04/24       Normalize gait - good heel strike and toe off         PT Goal 2       Start:  05/06/24    Expected End:  08/04/24       Improve LEFS by 15%         PT Goal 3       Start:  05/06/24    Expected End:  08/04/24       Maximize shoulder ROM          PT Goal 4       Start:  05/06/24    Expected End:  08/04/24       5/5 le strength          PT Goal 5       Start:  05/06/24    Expected End:  08/04/24       SLS 10 sec+               "

## 2024-06-17 ENCOUNTER — TREATMENT (OUTPATIENT)
Dept: PHYSICAL THERAPY | Facility: CLINIC | Age: 84
End: 2024-06-17
Payer: MEDICARE

## 2024-06-17 DIAGNOSIS — M15.9 PRIMARY OSTEOARTHRITIS INVOLVING MULTIPLE JOINTS: ICD-10-CM

## 2024-06-17 PROCEDURE — 97110 THERAPEUTIC EXERCISES: CPT | Mod: GP,CQ

## 2024-06-17 ASSESSMENT — PAIN - FUNCTIONAL ASSESSMENT: PAIN_FUNCTIONAL_ASSESSMENT: 0-10

## 2024-06-17 ASSESSMENT — PAIN SCALES - GENERAL: PAINLEVEL_OUTOF10: 2

## 2024-06-17 NOTE — PROGRESS NOTES
"Physical Therapy    Physical Therapy Treatment    Patient Name: Tanya Lance  MRN: [id number]  Today's Date: 6/17/2024  Visit: 5/8  Insurance: [employer]  Authorization: 5/6/24-8/6/24  PT Therapeutic Procedures Time Entry  Therapeutic Exercise Time Entry: 45  Time Calculation  Start Time: 0845  Stop Time: 0930  Time Calculation (min): 45 min  PT Therapeutic Procedures Time Entry  Therapeutic Exercise Time Entry: 45      Assessment:  PT Assessment  Evaluation/Treatment Tolerance: Patient tolerated treatment well  Patient performs her repeated exercises this session well without major complaints, added IR and lateral step ups, no issues with IR although stated lateral step ups were aggravating to the left knee, no pain level given. Progressing strength well since last seen by this therapist. Finishes session doing well, no current pain reported. Reviewed IR exercise with handout given/reviewed.     Plan:   Continue treatment per current POC  Current Problem  1. Primary osteoarthritis involving multiple joints  Follow Up In Physical Therapy      General        Subjective    Doing better, knees and shoulder have been less painful the past few days. Doing her HEP daily and increased her walking. Low left knee pain today around 2/10, 2/10 shoulder.     Pain  Pain Assessment  Pain Assessment: 0-10  Pain Score: 2    Objective     Treatments: 40 min  Therapeutic Exercise  Therapeutic Exercise Performed: Yes  Recumbent stepper lvl 2.5 x 7 min  Step stretch 20X  Pulley flx/scaption x 5 min (not today, torn string)  Ball rolls on table fwd/lateral x 20 each  RTB rowing x 20  RTB extensions x 20  Single YTB bilateral ER x 20 (partial rom)  Added RTB IR x 20  Mini squats 20X  SLS on blue foam 10X each  6\" step us x 15 each  Added 6\" lateral step ups x 15 each  Supine shoulder press, flies and triceps 2# 20X each  SAQ with 4# 20X     OP EDUCATION: HEP      Goals:  Active       PT Problem       PT Goal 1       Start:  05/06/24    " Expected End:  08/04/24       Normalize gait - good heel strike and toe off         PT Goal 2       Start:  05/06/24    Expected End:  08/04/24       Improve LEFS by 15%         PT Goal 3       Start:  05/06/24    Expected End:  08/04/24       Maximize shoulder ROM          PT Goal 4       Start:  05/06/24    Expected End:  08/04/24       5/5 le strength          PT Goal 5       Start:  05/06/24    Expected End:  08/04/24       SLS 10 sec+

## 2024-06-24 ENCOUNTER — TREATMENT (OUTPATIENT)
Dept: PHYSICAL THERAPY | Facility: CLINIC | Age: 84
End: 2024-06-24
Payer: MEDICARE

## 2024-06-24 DIAGNOSIS — M15.9 PRIMARY OSTEOARTHRITIS INVOLVING MULTIPLE JOINTS: ICD-10-CM

## 2024-06-24 PROCEDURE — 97110 THERAPEUTIC EXERCISES: CPT | Mod: GP | Performed by: PHYSICAL THERAPIST

## 2024-06-24 NOTE — PROGRESS NOTES
"Physical Therapy    Physical Therapy Treatment    Patient Name: Tanya Lance  MRN: [id number]  Today's Date: 6/24/2024  Visit: 6/8  Insurance: [employer]  Authorization: 5/6/24-8/6/24  PT Therapeutic Procedures Time Entry  Therapeutic Exercise Time Entry: 40  Time Calculation  Start Time: 0840  Stop Time: 0920  Time Calculation (min): 40 min  PT Therapeutic Procedures Time Entry  Therapeutic Exercise Time Entry: 40      Assessment:  Patient with improved gait.  Some increased stiffness this am - but better after doing exercises.      Plan:   Continue treatment per current POC    Current Problem  1. Primary osteoarthritis involving multiple joints  Follow Up In Physical Therapy      General        Subjective    Stiff this morning.   Low left knee pain today around 5/10, 3/10 shoulder.     Pain       Objective     Normal gait     Treatments: 40 min     Recumbent stepper lvl 2.5 x 7 min  Step stretch 20X  Pulley flx/scaption x 5 min (not today, torn string)  Ball rolls on table fwd/lateral x 20 each  RTB rowing x 20  RTB extensions x 20  Single YTB bilateral ER x 20 (partial rom)  Added RTB IR x 20  Mini squats 20X  SLS on blue foam 10X each  6\" step us x 15 each  Added 6\" lateral step ups x 15 each  Supine shoulder press, flies and triceps 3# 10X each  SAQ with 5# 20X   (40 minutes)    OP EDUCATION: HEP      Goals:  Active       PT Problem       PT Goal 1       Start:  05/06/24    Expected End:  08/04/24       Normalize gait - good heel strike and toe off         PT Goal 2       Start:  05/06/24    Expected End:  08/04/24       Improve LEFS by 15%         PT Goal 3       Start:  05/06/24    Expected End:  08/04/24       Maximize shoulder ROM          PT Goal 4       Start:  05/06/24    Expected End:  08/04/24       5/5 le strength          PT Goal 5       Start:  05/06/24    Expected End:  08/04/24       SLS 10 sec+             "

## 2024-07-01 ENCOUNTER — TREATMENT (OUTPATIENT)
Dept: PHYSICAL THERAPY | Facility: CLINIC | Age: 84
End: 2024-07-01
Payer: MEDICARE

## 2024-07-01 DIAGNOSIS — M15.9 PRIMARY OSTEOARTHRITIS INVOLVING MULTIPLE JOINTS: ICD-10-CM

## 2024-07-01 PROCEDURE — 97110 THERAPEUTIC EXERCISES: CPT | Mod: GP | Performed by: PHYSICAL THERAPIST

## 2024-07-01 NOTE — PROGRESS NOTES
"Physical Therapy    Physical Therapy Treatment    Patient Name: Tanya Lance  MRN: [id number]  Today's Date: 7/1/2024  Visit: 7/8  Insurance: [facility] Medicare  Authorization: 5/6/24-8/6/24  PT Therapeutic Procedures Time Entry  Therapeutic Exercise Time Entry: 40  Time Calculation  Start Time: 0845  Stop Time: 0925  Time Calculation (min): 40 min  PT Therapeutic Procedures Time Entry  Therapeutic Exercise Time Entry: 40      Assessment:  Patient with flare up of symptoms this weekend which patient relates to change in weather.  Better with heat, tyelonol, rest and gentle exercises.        Plan:   Continue treatment per current POC  Reassess next visit     Current Problem  1. Primary osteoarthritis involving multiple joints  Follow Up In Physical Therapy      General        Subjective      left knee and shoulder pain today around 10/10, 10/10 on Saturday - relates to the weather    Pain       Objective         Treatments: 40 min   Pulley's for shoulder elevation 5 minutes   Step stretch 20X  Ball rolls on table fwd/lateral x 20 each  Standing curls, triceps, shoulder abd with 1# 20X   RTB rowing x 20  RTB extensions x 20  Single YTB bilateral ER x 20 (partial rom)  Added RTB IR x 20  Mini squats 20X  SLS on blue foam 10X each  6\" step us x 15 each  Supine shoulder press, flies and triceps 3# 10X each  SAQ with 5# 20X   Recumbent stepper lvl 2.5 x 5 minutes   (40 minutes)    OP EDUCATION: HEP      Goals:  Active       PT Problem       PT Goal 1       Start:  05/06/24    Expected End:  08/04/24       Normalize gait - good heel strike and toe off         PT Goal 2       Start:  05/06/24    Expected End:  08/04/24       Improve LEFS by 15%         PT Goal 3       Start:  05/06/24    Expected End:  08/04/24       Maximize shoulder ROM          PT Goal 4       Start:  05/06/24    Expected End:  08/04/24       5/5 le strength          PT Goal 5       Start:  05/06/24    Expected End:  08/04/24       SLS 10 sec+               "

## 2024-07-10 ENCOUNTER — OFFICE VISIT (OUTPATIENT)
Dept: GERIATRIC MEDICINE | Facility: CLINIC | Age: 84
End: 2024-07-10
Payer: MEDICARE

## 2024-07-10 VITALS
TEMPERATURE: 97 F | SYSTOLIC BLOOD PRESSURE: 125 MMHG | WEIGHT: 133.7 LBS | DIASTOLIC BLOOD PRESSURE: 69 MMHG | HEART RATE: 106 BPM | BODY MASS INDEX: 22.95 KG/M2 | RESPIRATION RATE: 18 BRPM

## 2024-07-10 DIAGNOSIS — N18.32 TYPE 2 DIABETES MELLITUS WITH STAGE 3B CHRONIC KIDNEY DISEASE, WITHOUT LONG-TERM CURRENT USE OF INSULIN (MULTI): ICD-10-CM

## 2024-07-10 DIAGNOSIS — E11.22 TYPE 2 DIABETES MELLITUS WITH STAGE 3B CHRONIC KIDNEY DISEASE, WITHOUT LONG-TERM CURRENT USE OF INSULIN (MULTI): ICD-10-CM

## 2024-07-10 LAB
ANION GAP SERPL CALC-SCNC: 13 MMOL/L (ref 10–20)
BUN SERPL-MCNC: 20 MG/DL (ref 6–23)
CALCIUM SERPL-MCNC: 9.8 MG/DL (ref 8.6–10.6)
CHLORIDE SERPL-SCNC: 107 MMOL/L (ref 98–107)
CO2 SERPL-SCNC: 27 MMOL/L (ref 21–32)
CREAT SERPL-MCNC: 1.24 MG/DL (ref 0.5–1.05)
EGFRCR SERPLBLD CKD-EPI 2021: 42 ML/MIN/1.73M*2
GLUCOSE SERPL-MCNC: 121 MG/DL (ref 74–99)
POTASSIUM SERPL-SCNC: 4.1 MMOL/L (ref 3.5–5.3)
SODIUM SERPL-SCNC: 143 MMOL/L (ref 136–145)

## 2024-07-10 PROCEDURE — 1159F MED LIST DOCD IN RCRD: CPT | Performed by: NURSE PRACTITIONER

## 2024-07-10 PROCEDURE — 1036F TOBACCO NON-USER: CPT | Performed by: NURSE PRACTITIONER

## 2024-07-10 PROCEDURE — 99215 OFFICE O/P EST HI 40 MIN: CPT | Performed by: NURSE PRACTITIONER

## 2024-07-10 PROCEDURE — 1125F AMNT PAIN NOTED PAIN PRSNT: CPT | Performed by: NURSE PRACTITIONER

## 2024-07-10 PROCEDURE — 36415 COLL VENOUS BLD VENIPUNCTURE: CPT | Performed by: NURSE PRACTITIONER

## 2024-07-10 PROCEDURE — 1160F RVW MEDS BY RX/DR IN RCRD: CPT | Performed by: NURSE PRACTITIONER

## 2024-07-10 PROCEDURE — 80048 BASIC METABOLIC PNL TOTAL CA: CPT | Performed by: NURSE PRACTITIONER

## 2024-07-10 PROCEDURE — 83036 HEMOGLOBIN GLYCOSYLATED A1C: CPT | Performed by: NURSE PRACTITIONER

## 2024-07-10 ASSESSMENT — ENCOUNTER SYMPTOMS
DYSURIA: 0
SHORTNESS OF BREATH: 0
DYSPHORIC MOOD: 0
TROUBLE SWALLOWING: 0
DIZZINESS: 0
ABDOMINAL PAIN: 0
LOSS OF SENSATION IN FEET: 0
UNEXPECTED WEIGHT CHANGE: 1
ARTHRALGIAS: 1
CONSTIPATION: 1
WHEEZING: 0
NERVOUS/ANXIOUS: 0
DIFFICULTY URINATING: 0
APPETITE CHANGE: 1
LIGHT-HEADEDNESS: 0
SLEEP DISTURBANCE: 0
HEADACHES: 0
COUGH: 0
OCCASIONAL FEELINGS OF UNSTEADINESS: 0
PALPITATIONS: 0

## 2024-07-10 ASSESSMENT — PATIENT HEALTH QUESTIONNAIRE - PHQ9
2. FEELING DOWN, DEPRESSED OR HOPELESS: NOT AT ALL
SUM OF ALL RESPONSES TO PHQ9 QUESTIONS 1 AND 2: 0
1. LITTLE INTEREST OR PLEASURE IN DOING THINGS: NOT AT ALL

## 2024-07-10 ASSESSMENT — PAIN SCALES - GENERAL: PAINLEVEL: 6

## 2024-07-10 NOTE — PROGRESS NOTES
Subjective   Ms. Lance is 84 y.o. year old female and here for f/u of   Chief Complaint   Patient presents with    Follow-up    HTN, DM    Here with self.    Last visit 041024  Per pt discussion/summary: note reviewedwill send to therapy for oa pain, fu 3 mos     Interim events: none     HPI   Per patient   - PT has helped with her pain, bothers her still with rain  - went to podiatry, nails are brittle and cracking-planning to take a supplement from podiatry to help with this.   - family worried about eating says she is losing wt, but she is stable per our  scale   - staying up late to watch the news  -has never had to use an epipen but has avail at pharm prn     Review of Systems   Constitutional:  Positive for appetite change (food  just doesnot taste the same and does not like to cook) and unexpected weight change (see hpi).   HENT:  Negative for dental problem and trouble swallowing.    Respiratory:  Negative for cough, shortness of breath and wheezing.    Cardiovascular:  Negative for chest pain, palpitations and leg swelling.   Gastrointestinal:  Positive for constipation (has not neededmiralax recently). Negative for abdominal pain.   Genitourinary:  Negative for difficulty urinating and dysuria.   Musculoskeletal:  Positive for arthralgias (L knee improved with therapy also R shoulder pain).   Skin:         Thinksshe has eczema on wrists, not open, no itching    Neurological:  Negative for dizziness, light-headedness and headaches.   Psychiatric/Behavioral:  Negative for dysphoric mood and sleep disturbance. The patient is not nervous/anxious.        Objective   /69   Pulse 106   Temp 36.1 °C (97 °F) (Tympanic)   Resp 18   Wt 60.6 kg (133 lb 11.2 oz)   BMI 22.95 kg/m²   MoCA:  /30  No visits with results within 3 Month(s) from this visit.   Latest known visit with results is:   Office Visit on 04/10/2024   Component Date Value Ref Range Status    Cholesterol 04/10/2024 124  0 - 199 mg/dL Final     HDL-Cholesterol 04/10/2024 57.4  mg/dL Final    Cholesterol/HDL Ratio 04/10/2024 2.2   Final    LDL Calculated 04/10/2024 51  <=99 mg/dL Final    VLDL 04/10/2024 16  0 - 40 mg/dL Final    Triglycerides 04/10/2024 78  0 - 149 mg/dL Final    Non HDL Cholesterol 04/10/2024 67  0 - 149 mg/dL Final         Physical Exam  Vitals reviewed.   Constitutional:       Appearance: Normal appearance.      Comments: NAD   HENT:      Head: Normocephalic and atraumatic.      Comments: Hearing adequate for conversation     Nose: Nose normal.      Mouth/Throat:      Mouth: Mucous membranes are moist.      Pharynx: Oropharynx is clear.   Eyes:      Conjunctiva/sclera: Conjunctivae normal.   Cardiovascular:      Rate and Rhythm: Regular rhythm. Tachycardia present.      Pulses: Normal pulses.      Heart sounds: Normal heart sounds.   Pulmonary:      Effort: Pulmonary effort is normal.      Breath sounds: Normal breath sounds.   Abdominal:      General: Abdomen is flat. Bowel sounds are normal.      Palpations: Abdomen is soft.   Musculoskeletal:         General: Normal range of motion.      Cervical back: Normal range of motion and neck supple.   Skin:     General: Skin is warm and dry.      Capillary Refill: Capillary refill takes less than 2 seconds.      Comments: Skin changes on bilat wrists medial aspect, no open areas. No itching    Neurological:      General: No focal deficit present.      Mental Status: She is alert and oriented to person, place, and time.   Psychiatric:         Mood and Affect: Mood normal.       Assessment/Plan     Tachycardia  - 106 today, has been high in past as well, RRR and asymptomatic  - may be related to fluid intake, pt to drink more    Appetite changes  - instruct to eat protein daily   - monitor wts, pt has not lost per our scale but family is concerned  - fu 3 mos    Type 2 diabetes mellitus with stage 3b chronic kidney disease, without long-term current use of insulin (CMS/[facility])  - last A1c 7.6, pt  on glipizide only, not on metformin due to ckd  - does not check BG in home as it brings up bad memories of son   - A1c today, plan to do q3 mos due to not checking at home  - consider additional agent with low potential for hypoglycemia perhaps [id number] although renal status will also dictate type     Primary hypertension  History of stroke   Mixed hyperlipidemia  - on statin and plavix , continue same with hx of stroke   - last lipid panel 311004 all wnl   - amlodipine     CKD 3b  - stable pe last labs, will continue to monitor labs today   - meds reviewed    primary osteoarthritis involving multiple joints  - increase tylenol use   - PT helping  - uses turmeric     Insomnia, unspecified type  - stable uses mid nite prn, has been staying up late to watch news, she will try to go to bed earlier    Vitamin D insufficiency  - supps       HM  - labs needed A1c, bmp  - declines immunizations due to hx of reactions   - last mammo 2018, opts out of future   - no c scope due to age unless medically indicated   - AWV due on or after [id number]    Dispo: tachy today but may be underhydrated, asymptomatic and instructed on need to call if sx arise. Check labs, probably needs additonal agent for glucose but w/o checking BG at home, concerned about potential for low blood sugar, will need to discuss next visit. Monitor wts with decreased appetite.

## 2024-07-10 NOTE — PATIENT INSTRUCTIONS
Thank you for meeting with me today. We discussed the following:   Heart rate is a little elevated, drink more water you might be dehydrated.   Call if you get any chest pain or short of breath.   Try to eat protein daily, 40-60 G a day is needed.   We will check labs today.     In addition, here are some general guidelines on brain health, pain control and sleep.   General brain health guidelines:  - Make sure your medical conditions are well controlled (e.g., high blood pressure, high cholesterol, diabetes, sleep apnea etc)  - Do not smoke or chew tobacco  - Limiit alcohol use to no more than 1 alcoholic beverage per day   - Address any sensory deficits (e.g., proper glasses for poor eyesight, hearing aids for hearing loss)  - Use a weekly pill box  - Eat a heart healthy diet (fruits, vegetables, lean meats, fatty fish, whole grains. Limit processed foods)  - Exercise or walk. Gradually increase to the goal of 5 days per week, 30 minutes at a time  - Try to get at least 7 hours of quality sleep per night  - Keep yourself mentally active daily by reading, playing cards, doing word searches, puzzles, etc.  - Challenge your brain with new cognitive tasks (new hobby, crafts, take a class, learn a language)  - Stay socially active by being part of a group or organization    General pain control guidelines  - try to stay ahead of pain by taking your medications sooner rather than later  - Tyelnol is generally a safe medication to take for pain. A general dose is 1000 mg every 6-8 hours; do not exceed 3000 mg or 3 doses in a day. Stay away from formulations that have Benadryl or diphenhydramine in them.   - Lidocaine gel or patch may help to relieve pain. 4% strength is over the counter.  - Voltaren gel 1% may be helpful in relieving pain. It is available over the counter.   - avoid Nonsteroidal Anti-inflammatories (Nsaids) such as Motrin (ibuprofen), Aleve (naproxen) unless specifically recommended by your provider;   these can cause gastrointestinal and kidney problems.   - Use heat or cold as needed to help with pain.   - Distraction can be helpful.     General sleep guidelines  - Avoid over the counter sleep preparations with diphenhydramine or Benadryl. This medicine can cause confusion and increase risk of falls.   - Do not use alcohol to go to sleep.   - Melatonin is generally safe to try, start with 1-3 mg a couple of hours before sleep.   - Avoid all caffeine after 12 noon. Look for hidden sources such as chocolate.   - Try an herbal tea like chamomile or Sleepytime before bed.  - Turn off the TV or set a timer so it goes off.   - Establish a bedtime routine to tell your body it is time to sleep. It can be some relaxing music, reading a book, taking a shower, prayer/meditation, etc.       Please follow up with us in return to clinic: 3-4 months. Please bring all medications to follow up appointments.   If the weather is bad on the day of your next appointment, it can be changed to a virtual visit. Please call the office on the day of the visit and ask them to change it to a virtual visit.

## 2024-07-11 LAB
EST. AVERAGE GLUCOSE BLD GHB EST-MCNC: 160 MG/DL
HBA1C MFR BLD: 7.2 %

## 2024-07-11 NOTE — RESULT ENCOUNTER NOTE
A1c  better, 7.2 now down from 7.6 but still a little high, I would like under 7. We will go up on her glipizide to 7.5 mg in the mornings, that would be 1.5 tabs of her current dose (which is 5mg) and keep the same dose of 10 mg in the afternoon.  Watch for sx of hypoglycemia and if occur, call us. Kidneys show some mild improvement.  Can check all again in about 3 mos. [place]

## 2024-07-18 ENCOUNTER — TREATMENT (OUTPATIENT)
Dept: PHYSICAL THERAPY | Facility: CLINIC | Age: 84
End: 2024-07-18
Payer: MEDICARE

## 2024-07-18 DIAGNOSIS — M15.9 PRIMARY OSTEOARTHRITIS INVOLVING MULTIPLE JOINTS: ICD-10-CM

## 2024-07-18 PROCEDURE — 97110 THERAPEUTIC EXERCISES: CPT | Mod: GP | Performed by: PHYSICAL THERAPIST

## 2024-07-18 NOTE — PROGRESS NOTES
"Physical Therapy    Physical Therapy Treatment    Patient Name: Tanya Lance  MRN: [id number]  Today's Date: 7/18/2024  Visit: 8/8  Insurance: [facility] Medicare  Authorization: 5/6/24-8/6/24  PT Therapeutic Procedures Time Entry  Therapeutic Exercise Time Entry: 40  Time Calculation  Start Time: 0845  Stop Time: 0925  Time Calculation (min): 40 min  PT Therapeutic Procedures Time Entry  Therapeutic Exercise Time Entry: 40      Assessment:  Patient seen in PT for 8 visits for primary oa of multiple joints.  Patient with good progress towards PT goals including:   improved gait with improved heel strike and toe off, improved LEFS to 76.25%, improved shoulder ROM, 4+/5 knee strength, and improved SLS to 10 sec+.  Discharge to HEP.       Plan:  Discharge to HEP.        Current Problem  1. Primary osteoarthritis involving multiple joints  Follow Up In Physical Therapy      General        Subjective    Knee pain 7/10 at worst last 2 days  Shoulder pain 8/10 at worst last 2 days   Less knee and shoulder pain and improved mobility  Able to walk in building 20 minutes    Pain       Objective     Normal gait   SLS 10 sec   Right Shoulder ROM elevation 145, er 75, HBB to T5  Left knee strength 4+/5    Treatments: 40 min  Recumbent stepper lvl 2.5 x 5 minutes   Step stretch 20X  SLS 5X each  Ball rolls on table fwd/lateral x 20 each  Shoulder pulleys 5 minutes  Mini squats 20X  6\" step us x 15 each  SAQ with 5# 20X   (40 minutes)    OP EDUCATION: HEP      Goals:  Active       PT Problem       PT Goal 1       Start:  05/06/24    Expected End:  08/04/24       Normalize gait - good heel strike and toe off         PT Goal 2       Start:  05/06/24    Expected End:  08/04/24       Improve LEFS by 15%         PT Goal 3       Start:  05/06/24    Expected End:  08/04/24       Maximize shoulder ROM          PT Goal 4       Start:  05/06/24    Expected End:  08/04/24       5/5 le strength          PT Goal 5       Start:  05/06/24    Expected " End:  08/04/24       SLS 10 sec+

## 2024-08-10 ENCOUNTER — APPOINTMENT (OUTPATIENT)
Dept: RADIOLOGY | Facility: HOSPITAL | Age: 84
End: 2024-08-10
Payer: MEDICARE

## 2024-08-10 ENCOUNTER — HOSPITAL ENCOUNTER (EMERGENCY)
Facility: HOSPITAL | Age: 84
Discharge: HOME | End: 2024-08-11
Attending: INTERNAL MEDICINE
Payer: MEDICARE

## 2024-08-10 VITALS
HEIGHT: 64 IN | OXYGEN SATURATION: 98 % | RESPIRATION RATE: 18 BRPM | TEMPERATURE: 97.8 F | WEIGHT: 140 LBS | DIASTOLIC BLOOD PRESSURE: 67 MMHG | HEART RATE: 79 BPM | BODY MASS INDEX: 23.9 KG/M2 | SYSTOLIC BLOOD PRESSURE: 137 MMHG
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
## 2024-08-10 DIAGNOSIS — M25.562 ACUTE PAIN OF LEFT KNEE: Primary | ICD-10-CM

## 2024-08-10 DIAGNOSIS — S60.512A ABRASION OF LEFT HAND, INITIAL ENCOUNTER: ICD-10-CM

## 2024-08-10 DIAGNOSIS — S00.83XA CONTUSION OF CHIN, INITIAL ENCOUNTER: ICD-10-CM

## 2024-08-10 PROCEDURE — 72125 CT NECK SPINE W/O DYE: CPT

## 2024-08-10 PROCEDURE — 73130 X-RAY EXAM OF HAND: CPT | Mod: LT

## 2024-08-10 PROCEDURE — 2500000001 HC RX 250 WO HCPCS SELF ADMINISTERED DRUGS (ALT 637 FOR MEDICARE OP): Performed by: PHARMACIST

## 2024-08-10 PROCEDURE — 70486 CT MAXILLOFACIAL W/O DYE: CPT

## 2024-08-10 PROCEDURE — 73564 X-RAY EXAM KNEE 4 OR MORE: CPT | Mod: LT

## 2024-08-10 PROCEDURE — 70450 CT HEAD/BRAIN W/O DYE: CPT | Performed by: RADIOLOGY

## 2024-08-10 PROCEDURE — 76377 3D RENDER W/INTRP POSTPROCES: CPT | Performed by: RADIOLOGY

## 2024-08-10 PROCEDURE — 76377 3D RENDER W/INTRP POSTPROCES: CPT

## 2024-08-10 PROCEDURE — 73130 X-RAY EXAM OF HAND: CPT | Mod: LEFT SIDE | Performed by: RADIOLOGY

## 2024-08-10 PROCEDURE — 72125 CT NECK SPINE W/O DYE: CPT | Performed by: RADIOLOGY

## 2024-08-10 PROCEDURE — 70486 CT MAXILLOFACIAL W/O DYE: CPT | Performed by: RADIOLOGY

## 2024-08-10 PROCEDURE — 99285 EMERGENCY DEPT VISIT HI MDM: CPT | Mod: 25

## 2024-08-10 PROCEDURE — 70450 CT HEAD/BRAIN W/O DYE: CPT

## 2024-08-10 PROCEDURE — 73564 X-RAY EXAM KNEE 4 OR MORE: CPT | Mod: LEFT SIDE | Performed by: RADIOLOGY

## 2024-08-10 RX ORDER — ACETAMINOPHEN 325 MG/1
650 TABLET ORAL ONCE
Status: COMPLETED | OUTPATIENT
Start: 2024-08-10 | End: 2024-08-10

## 2024-08-10 RX ORDER — PETROLATUM 420 MG/G
OINTMENT TOPICAL ONCE
Status: DISCONTINUED | OUTPATIENT
Start: 2024-08-10 | End: 2024-08-10 | Stop reason: CLARIF

## 2024-08-10 RX ADMIN — ACETAMINOPHEN 650 MG: 325 TABLET ORAL at 22:15

## 2024-08-10 RX ADMIN — SKIN PROTECTANT: 33 OINTMENT TOPICAL at 23:59

## 2024-08-10 ASSESSMENT — COLUMBIA-SUICIDE SEVERITY RATING SCALE - C-SSRS
6. HAVE YOU EVER DONE ANYTHING, STARTED TO DO ANYTHING, OR PREPARED TO DO ANYTHING TO END YOUR LIFE?: NO
1. IN THE PAST MONTH, HAVE YOU WISHED YOU WERE DEAD OR WISHED YOU COULD GO TO SLEEP AND NOT WAKE UP?: NO
2. HAVE YOU ACTUALLY HAD ANY THOUGHTS OF KILLING YOURSELF?: NO

## 2024-08-10 ASSESSMENT — PAIN DESCRIPTION - FREQUENCY: FREQUENCY: CONSTANT/CONTINUOUS

## 2024-08-10 ASSESSMENT — PAIN DESCRIPTION - ORIENTATION
ORIENTATION_2: LEFT
ORIENTATION_3: LEFT
ORIENTATION: LEFT

## 2024-08-10 ASSESSMENT — PAIN SCALES - GENERAL
PAINLEVEL_OUTOF10: 9
PAINLEVEL_OUTOF10: 7

## 2024-08-10 ASSESSMENT — PAIN - FUNCTIONAL ASSESSMENT: PAIN_FUNCTIONAL_ASSESSMENT: 0-10

## 2024-08-10 ASSESSMENT — PAIN DESCRIPTION - DESCRIPTORS
DESCRIPTORS_2: ACHING
DESCRIPTORS_3: ACHING
DESCRIPTORS: ACHING

## 2024-08-10 ASSESSMENT — PAIN DESCRIPTION - LOCATION
LOCATION_3: KNEE
LOCATION_2: HAND
LOCATION: FACE

## 2024-08-10 ASSESSMENT — PAIN DESCRIPTION - PAIN TYPE: TYPE: ACUTE PAIN

## 2024-08-11 DIAGNOSIS — Z86.73 HISTORY OF CVA (CEREBROVASCULAR ACCIDENT): ICD-10-CM

## 2024-08-11 NOTE — DISCHARGE INSTRUCTIONS
You were seen today for a mechanical fall.  You have a little fluid on your left knee which may suggest an underlying ligamentous or meniscal injury or swelling from underlying arthritis.  We placed an Ace wrap to help with swelling and stability.  You can also buy an over-the-counter knee brace to help with stability.  You can take Tylenol as needed for pain.  Apply ice as needed for swelling. Follow-up with orthopedics as needed for persistent knee pain.  You have a bruise to your chin and scrapes to your hand but no bony injury.  We cleaned your wounds and you can help them heal by applying bacitracin or Vaseline ointment.  Your evaluation was not concerning for an emergency at this time. Please see the attached information sheet for information about your condition, how to care for your condition at home, and reasons to return to the emergency department. Take any prescriptions written today as prescribed. You should call your primary care provider within 24 hours to tell them about today's visit, including any new medications or medication changes, as he or she may want to see you in the office for further evaluation. If you do not have a primary care provider, call  [phone] for an appointment. We offer in-person office visits as well as virtual options. Please do not hesitate to call  [phone] or return to the emergency department with any new or unresolved concerns or symptoms. Thank you for choosing [facility] for your care.

## 2024-08-11 NOTE — ED PROVIDER NOTES
HPI     CC: Fall and Head Injury     HPI: Tanya Lance is a 84 y.o. female with a history of HTN, HLD, DM, CVA on Plavix, CKD, OA, insomnia, vitamin D insufficiency, presents with mechanical fall.  Patient states that she fell outside her apartment on an uneven sidewalk.  She states the fall was completely mechanical.  She fell onto her hands and knees, hitting her chin on the ground.  She endorses injury predominantly to the left side, including the left knee, left palm/knuckles as well as the left side of her chin.  She also has a mild headache.  She did not lose consciousness.  She denies pain in the neck, chest, back, or abdomen.  She is able to ambulate albeit with some difficulty, endorsing pain in the left patella.  She states she is allergic to tetanus.    ROS: 10-point review of systems was performed and is otherwise negative except as noted in HPI.    Limitations to history: N/A    Independent Historians: Family member at bedside    External Records Reviewed: Outpatient notes in EMR    Past Medical History: Noncontributory except per HPI     Past Surgical History: Noncontributory except per HPI     Family History: Reviewed and noncontributory     Social History:  Denies tobacco. Denies ETOH. Denies illicit drugs.    Social Determinants Affecting Care: N/A    Allergies   Allergen Reactions    Aspirin Unknown    Bacitracin Unknown    Beta-Blockers (Beta-Adrenergic Blocking Agts) Unknown    Cobalt Unknown    Egg Unknown    Lisinopril Unknown    Menthol Unknown    Meperidine Unknown    Metformin GI bleeding    Metoprolol Unknown    Milk Unknown    Neomycin Unknown    Perfume Unknown    Shellfish Derived Unknown     shellfish    Sulfamethoxazole-Trimethoprim Unknown       Home Meds:   Current Outpatient Medications   Medication Instructions    acetaminophen (Tylenol Extra Strength) 500 mg tablet oral, 2 times daily    amLODIPine (NORVASC) 7.5 mg, oral, Daily    ascorbic acid (VITAMIN C) 2,000 mg, oral, Daily     "atorvastatin (LIPITOR) 10 mg, oral, Daily    bromelains-melatonin-herbal233 (MidNite PM) 20-1.5-22 mg-mg-mcg tablet,chewable 1 tablet, oral, Nightly    cholecalciferol (VITAMIN D-3) 50 mcg, oral, Daily    clopidogrel (PLAVIX) 75 mg, oral, Daily    EPINEPHrine (EPIPEN) 0.3 mg, intramuscular, Once as needed, As Directed    glipiZIDE (Glucotrol) 5 mg tablet oral, 7.5 mg in AM and 10 mg in the afternoon    mv-min/vit C/elderber/herb 124 (AIRBORNE ELDERBERRY ORAL) 2 capsules, oral, Daily    turmeric 400 mg capsule 1 capsule, oral, Daily        Physical Exam     ED Triage Vitals [08/10/24 2132]   Temperature Heart Rate Respirations BP   36.6 °C (97.8 °F) 87 18 146/88      Pulse Ox Temp Source Heart Rate Source Patient Position   97 % Temporal -- --      BP Location FiO2 (%)     -- --         Heart Rate:  [79-87]   Temperature:  [36.6 °C (97.8 °F)]   Respirations:  [18]   BP: (137-146)/(67-88)   Height:  [162.6 cm (5' 4\")]   Weight:  [63.5 kg (140 lb)]   Pulse Ox:  [97 %-98 %]      Physical Exam  Vitals and nursing note reviewed.     CONSTITUTIONAL: Well appearing, well nourished, in no acute distress.   HENMT: Head atraumatic except for some bruising to the left shin, no bony step-offs or crepitus. No facial or scalp TTP. Airway patent. Nasal mucosa clear. Mouth with normal mucosa, clear oropharynx. Uvula midline. TMs clear bilaterally with no hemotympanum. Neck supple.    EYES: Clear bilaterally. No periorbital TTP.  Pupils equally round and reactive to light, extraocular movements grossly intact.    CARDIOVASCULAR: Normal rate, regular rhythm.  Heart sounds S1, S2.  No murmurs, rubs or gallops. Normal pulses. Capillary refill <2 sec.   RESPIRATORY: No increased work of breathing. Breath sounds clear and equal bilaterally.  GASTROINTESTINAL: Abdomen soft, non-distended, non-tender. No rebound, no guarding. Bowel sounds normal in all 4 quadrants. No palpable masses.   GENITOURINARY:  No CVA tenderness.  MUSCULOSKELETAL: " Abrasions left palm and PIPs with overlying tenderness.  No midline C/T/L TTP or stepoffs. No other muscle or joint deformity or TTP. No edema.  NEUROLOGICAL: GCS 15. Alert and oriented, no asymmetry, moving all extremities equally.  SKIN: Warm, dry and intact. No rash. No seatbelt sign, galaviz sign, raccoon eyes or other notable skin lesions except as noted above.   PSYCHIATRIC: Normal mood and affect.  HEME/LYMPH: No adenopathy or splenomegaly.    Diagnostic Results      ECG: ECGs read and interpreted by me. See ED Course, below, for interpretation.    Labs Reviewed - No data to display      XR hand left 3+ views   Final Result   No acute osseous abnormality.        Degenerative changes as above.             MACRO:   None        Signed by: Sharmin Madrid 8/10/2024 11:03 PM   Dictation workstation:   [id number]      XR knee left 4+ views   Final Result   No acute osseous abnormality.        Small knee joint effusion.        MACRO:   None        Signed by: Sharmin Madrid 8/10/2024 11:04 PM   Dictation workstation:   [id number]      CT head wo IV contrast   Final Result   No acute intracranial abnormality.        No acute facial bone fracture. Soft tissue swelling/contusion of the   chin.        No acute fracture or traumatic subluxation of the cervical spine.                  MACRO:   None        Signed by: Sharmin Madrid 8/10/2024 11:01 PM   Dictation workstation:   [id number]      CT cervical spine wo IV contrast   Final Result   No acute intracranial abnormality.        No acute facial bone fracture. Soft tissue swelling/contusion of the   chin.        No acute fracture or traumatic subluxation of the cervical spine.                  MACRO:   None        Signed by: Sharmin Madrid 8/10/2024 11:01 PM   Dictation workstation:   [id number]      CT maxillofacial bones wo IV contrast   Final Result   No acute intracranial abnormality.        No acute facial bone fracture. Soft tissue swelling/contusion of the    chin.        No acute fracture or traumatic subluxation of the cervical spine.                  MACRO:   None        Signed by: Sharmin Madrid 8/10/2024 11:01 PM   Dictation workstation:   [id number]      CT 3D reconstruction   Final Result   No acute intracranial abnormality.        No acute facial bone fracture. Soft tissue swelling/contusion of the   chin.        No acute fracture or traumatic subluxation of the cervical spine.                  MACRO:   None        Signed by: Sharmin Madrid 8/10/2024 11:01 PM   Dictation workstation:   [id number]                    [place] Coma Scale Score: 15                  Procedure  Procedures    ED Course & MDM   Assessment/Plan:   Tanya Lance is a 84 y.o. female with a history of HTN, HLD, DM, CVA on Plavix, CKD, OA, insomnia, vitamin D insufficiency, presents with mechanical fall.  She has a bruise to the left shin and some abrasions to her left hand dorsum and knuckles without obvious bony deformity.  She is complaining of some pain in the patella but has no reproducible tenderness to the left knee, is ambulatory without ligamentous laxity.  She is hemodynamically stable and well-appearing with a mild headache.  She was given Tylenol for pain.  Workup was initiated with CT of the head and cervical spine, x-rays of the hand and knee.  She is declining tetanus shot, states she is allergic. See below for details of ED course and ultimate disposition.    Medications   eucerin cream (has no administration in time range)   acetaminophen (Tylenol) tablet 650 mg (650 mg oral Given 8/10/24 2215)        ED Course as of 08/10/24 2335   Sat Aug 10, 2024   2310 XR knee small effusion, no fracture [CG]   2310 XR hand degenerative change, no fracture [CG]   2311 CTH/MF Soft tissue swelling/contusion of the  chin.   [CG]   2311 CT CS negative except for chronic changes [CG]   2328 Patient was advised of her reassuring workup.  RN to apply Ace wrap to her left knee and clean wounds,  apply bacitracin.  She can follow-up with Ortho as needed for her left knee pain as she may have subtle ligamentous injury. [CG]      ED Course User Index  [CG] Gabbi Schilling MD         Diagnoses as of 08/10/24 8013   Acute pain of left knee   Abrasion of left hand, initial encounter   Contusion of chin, initial encounter   Fall from standing, initial encounter       Disposition:   DISCHARGE.  The patient was discharged with both verbal and written anticipatory guidance and strict return precautions. Discharge diagnosis, instructions and plan were discussed and understood. I emphasized the importance of following up with their primary care provider within 24-48 hours and with any referrals in the timeframe recommended. At the time of discharge the patient was comfortable and was in no apparent distress. Patient is aware of diagnostic uncertainty and was notified though testing is negative here, there is a very small chance that pathology may be missed.  The patient understands these risks and the patient/family understood to call 911 or return immediately to the emergency department if the symptoms worsen or if they have any additional concerns.      FOLLOW UP  Primary care provider in 1-2 days.      ED Prescriptions    None         Gabbi Schilling MD  EM/IM/Peds    This note was dictated by speech recognition. Minor errors in transcription may be present.     Gabbi Schilling MD  08/10/24 [id number]

## 2024-08-11 NOTE — ED TRIAGE NOTES
Pt states she had a fall outside of her apartment today. Pt states she tripped on the sidewalk. Pt has swelling and bruising to the left side of the chin. Pt has abrasions to the left hand. Pt also c/o left knee and hand pain. Pt denies neck/back pain and denies Loc. Pt is on plavix

## 2024-08-12 RX ORDER — CLOPIDOGREL BISULFATE 75 MG/1
75 TABLET ORAL DAILY
Qty: 100 TABLET | Refills: 2 | Status: SHIPPED | OUTPATIENT
Start: 2024-08-12

## 2024-08-16 ENCOUNTER — OFFICE VISIT (OUTPATIENT)
Dept: ORTHOPEDIC SURGERY | Facility: CLINIC | Age: 84
End: 2024-08-16
Payer: MEDICARE

## 2024-08-16 ENCOUNTER — HOSPITAL ENCOUNTER (OUTPATIENT)
Dept: RADIOLOGY | Facility: CLINIC | Age: 84
Discharge: HOME | End: 2024-08-16
Payer: MEDICARE

## 2024-08-16 VITALS — HEIGHT: 64 IN | BODY MASS INDEX: 23.9 KG/M2 | WEIGHT: 140 LBS

## 2024-08-16 DIAGNOSIS — M25.562 ACUTE PAIN OF LEFT KNEE: ICD-10-CM

## 2024-08-16 DIAGNOSIS — M25.562 CHRONIC PAIN OF LEFT KNEE: ICD-10-CM

## 2024-08-16 DIAGNOSIS — M25.562 CHRONIC PAIN OF LEFT KNEE: Primary | ICD-10-CM

## 2024-08-16 DIAGNOSIS — G89.29 CHRONIC PAIN OF LEFT KNEE: ICD-10-CM

## 2024-08-16 DIAGNOSIS — G89.29 CHRONIC PAIN OF LEFT KNEE: Primary | ICD-10-CM

## 2024-08-16 PROCEDURE — 73564 X-RAY EXAM KNEE 4 OR MORE: CPT | Mod: LT

## 2024-08-16 PROCEDURE — 99215 OFFICE O/P EST HI 40 MIN: CPT | Performed by: STUDENT IN AN ORGANIZED HEALTH CARE EDUCATION/TRAINING PROGRAM

## 2024-08-16 PROCEDURE — L1812 KO ELASTIC W/JOINTS PRE OTS: HCPCS | Performed by: STUDENT IN AN ORGANIZED HEALTH CARE EDUCATION/TRAINING PROGRAM

## 2024-08-16 ASSESSMENT — ENCOUNTER SYMPTOMS
OCCASIONAL FEELINGS OF UNSTEADINESS: 1
DEPRESSION: 0
LOSS OF SENSATION IN FEET: 0

## 2024-08-16 ASSESSMENT — PAIN DESCRIPTION - DESCRIPTORS: DESCRIPTORS: ACHING;DISCOMFORT;PATIENT UNABLE TO DESCRIBE

## 2024-08-16 ASSESSMENT — PAIN SCALES - GENERAL: PAINLEVEL_OUTOF10: 9

## 2024-08-16 ASSESSMENT — PAIN - FUNCTIONAL ASSESSMENT: PAIN_FUNCTIONAL_ASSESSMENT: 0-10

## 2024-08-16 NOTE — PROGRESS NOTES
"FABI/TKA Related Summary           L hip: N  L knee: N  R hip: N  R knee: N  Lumbar surgery or significant pathology: N            CC/SUBJECTIVE/HPI            PCP: Justine Baumann MD  Referring provider: Gabbi Schilling MD  Tanya Lance is a 84 y.o. female presenting for L knee pain.  Her knee has been hurting her for many years, but she had a GLF several days ago outside of her senior living home, which exacerbated the pain.  She presented to the ED, where radiographs were negative for acute injury.    L knee  Symptoms  Pain: 9/10  Onset: after trauma/injury  Duration: >2yrs  Location: all over  Quality: sharp/stab  Limitations: morning stiffness, pain after activity, feeling unstable, difficulty in/out of chair/car, and activities the patient would like to do but can't (\"activities at the community center\")  Ambulation limit due to pain: unlimited but hurts later  Other symptoms: none  Treatment  Tried: activity modification, PT, home exercises, ice/heat, and OTCs  Most recent injection <3mo ago? na  Assistive device: none  Treatment attempted for <3mo and is partially effective            HISTORIES (System Generated and Pt Reported on Form Today)       Dental  Pt reported: No active issues     PMH  Pt reported: Denies heart/lung/kidney/liver issues, DM, stroke, seizure, bariatric surgery, anticoag, MRSA, cancer, personal/familial coagulopathies except: none in addition to below  System generated (PMH, problem list both included since EMR change caused discrepancies):   Past Medical History:   Diagnosis Date    Abdominal distension (gaseous) 2014    Abdominal distension    Acute upper respiratory infection, unspecified 2018    Acute upper respiratory infection    Age-related nuclear cataract, left eye 2018    Age-related nuclear cataract of left eye    Complete or unspecified spontaneous  without complication ([facility])         Constipation 2024    Constipation, " unspecified 2018    Constipation    Cortical age-related cataract, left eye 2018    Cortical age-related cataract of left eye    Disease of intestine, unspecified     Bowel trouble    Hypermetropia, unspecified eye 2018    Hyperopia with astigmatism and presbyopia    Left upper quadrant pain 2018    Abdominal pain, LUQ (left upper quadrant)    Myalgia, unspecified site     Muscle ache    Other conditions influencing health status     Arthritis    Other conditions influencing health status     History of pregnancy    Other conditions influencing health status 2014    Skin Lesion    Other postherpetic nervous system involvement 2014    Postherpetic neuralgia    Other skin changes 2018    Vesicular rash    Personal history of other complications of pregnancy, childbirth and the puerperium     History of spontaneous     Personal history of other diseases of the female genital tract     History of premenstrual syndrome    Personal history of other diseases of the musculoskeletal system and connective tissue 2013    History of tendinitis    Personal history of other diseases of the musculoskeletal system and connective tissue 2013    History of tendinitis    Personal history of other endocrine, nutritional and metabolic disease 2014    History of hyperglycemia    Personal history of other endocrine, nutritional and metabolic disease 2014    History of type 2 diabetes mellitus    Personal history of other infectious and parasitic diseases 2014    History of herpes zoster    Personal history of other infectious and parasitic diseases     History of hepatitis    Personal history of other specified conditions 2013    History of chest pain    Presence of intraocular lens 2018    Pseudophakia of left eye    Presence of intraocular lens 2018    Pseudophakia of right eye    Pruritus, unspecified 2014    Pruritus    Unspecified  conjunctivitis 08/13/2018    Conjunctivitis of left eye     Patient Active Problem List   Diagnosis    Allergic rhinitis    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   System generated:   Past Surgical History:   Procedure Laterality Date    APPENDECTOMY  05/07/2014    Appendectomy    MR HEAD ANGIO WO IV CONTRAST  7/14/2014    MR HEAD ANGIO WO IV CONTRAST 7/14/2014 [facility]    MR NECK ANGIO WO IV CONTRAST  7/14/2014    MR NECK ANGIO WO IV CONTRAST 7/14/2014 [facility] CLINICAL [facility]GAC[facility]    OTHER SURGICAL HISTORY  03/21/2014    Ovarian Cystectomy    TONSILLECTOMY  03/21/2014    Tonsillectomy    TOTAL VAGINAL HYSTERECTOMY  01/26/2016    Vaginal Hysterectomy     Family Hx  Pt reported clot/coagulopathies: none    Social Hx  Pt reported substance use: cigarettes (1PPD x50yrs)  System generated:   Social History     Tobacco Use    Smoking status: Never    Smokeless tobacco: Never     Allergies  Pt reported (meds, metals): per below  System generated:   Allergies   Allergen Reactions    Aspirin Unknown    Bacitracin Unknown    Beta-Blockers (Beta-Adrenergic Blocking Agts) Unknown    Cobalt Unknown    Egg Unknown    Lisinopril Unknown    Menthol Unknown    Meperidine Unknown    Metformin GI bleeding    Metoprolol Unknown    Milk Unknown    Neomycin Unknown    Perfume Unknown    Shellfish Derived Unknown     shellfish    Sulfamethoxazole-Trimethoprim Unknown     Current Meds  System generated:   Current Outpatient Medications:     acetaminophen (Tylenol Extra Strength) 500 mg tablet, Take by mouth twice a day., Disp: , Rfl:     amLODIPine (Norvasc) 2.5 mg tablet, Take 3 tablets (7.5 mg) by mouth once daily., Disp: 270 tablet, Rfl: 3    ascorbic acid (Vitamin C)  "500 mg tablet, Take 4 tablets (2,000 mg) by mouth once daily., Disp: , Rfl:     atorvastatin (Lipitor) 10 mg tablet, TAKE 1 TABLET BY MOUTH DAILY, Disp: 100 tablet, Rfl: 2    bromelains-melatonin-herbal233 (MidNite PM) 20-1.5-22 mg-mg-mcg tablet,chewable, Chew 1 tablet once daily at bedtime., Disp: , Rfl:     cholecalciferol (Vitamin D-3) 25 MCG (1000 UT) capsule, Take 2 capsules (50 mcg) by mouth once daily., Disp: , Rfl:     clopidogrel (Plavix) 75 mg tablet, Take 1 tablet (75 mg) by mouth once daily., Disp: 30 tablet, Rfl: 3    EPINEPHrine 0.3 mg/0.3 mL injection syringe, Inject 0.3 mL (0.3 mg) into the muscle 1 time if needed for anaphylaxis. As Directed, Disp: 1 each, Rfl: 3    glipiZIDE (Glucotrol) 5 mg tablet, Take by mouth. 7.5 mg in AM and 10 mg in the afternoon, Disp: , Rfl:     mv-min/vit C/elderber/herb 124 (AIRBORNE ELDERBERRY ORAL), Take 2 capsules by mouth once daily., Disp: , Rfl:     turmeric 400 mg capsule, Take 1 capsule by mouth once daily., Disp: , Rfl:   No current facility-administered medications for this visit.    ROS: Neg except HPI          OBJECTIVE            Physical exam  Estimated body mass index is 24.03 kg/m² as calculated from the following:    Height as of 8/10/24: 1.626 m (5' 4\").    Weight as of 8/10/24: 63.5 kg (140 lb).  Gen/psych: NAD, conversational, appropriate    Ambulation  Gait: Mildly antalgic  Assistive device: none  Spine  Lumbar tenderness: neg  Limited ROM: neg, with no radiation or pain with flex/ex, lateral bending  SLR test: neg    Focused MSK exam: L  Knee  Thrust: neg  Skin/incision: normal  Effusion: trace  Alignment: neutral  Alignment correctable: na  Knee tenderness: diffuse  Pes or Gerdy's tenderness: neg  Crepitation: trace  Extension: passive flexion contracture 0-5° and active extension lag 0°  Flexion: 115°  Anterior drawer: stable  Posterior drawer: stable  Varus/Valgus in extension: stable  Varus/Valgus in flexion: stable  Referred pain to knee with " hip 90° flexion and 45° ER/IR: neg  Neurovascular    Strength: 5/5 hip/knee/ankle flexion and extension  Sensory (L2-S1): SILT throughout lower extremity  Edema/stasis: no pitting edema  Pulse: DP 1+, PT 1+    Imaging  8/16/2024 L knee radiographs ([facility], WB AP/lateral, WB AP flexion) on my read: Joint space narrowing (medial tibiofemoral severe, lateral tibiofemoral moderate, patellofemoral mild) with sclerosis. Limb alignment mild varus.  Significant chondrocalcinosis medially and laterally.            ASSESSMENT & PLAN           Patient verbalized understanding of below A&P. All questions answered.  L knee DJD, chondrocalcinosis  Differential includes radicular pain from spine or referred pain from hip. H&P most consistent with knee origin.  General information  Evaluation, imaging, diagnosis, and treatment options (conservative and surgical as well as risks, benefits, recovery, and outcomes) discussed. Conservative options should be maximized and include activity modification, bracing, weight loss, PT, home exercise, local pain control (ice, heat, topicals), OTCs, and assistive devices. Once exhausted, injections may provide relief. If these fail to improve pain, function, and quality of life, elective arthroplasty may be an option.  Shared decision making   Patient elected for medial offloader brace, which was provided.  Discussed CSI, but patient expressed issues with previous corticosteroid.  Discussed viscosupplementation, and patient will contact office for referral if she is interested.  PMH, PSH, other considerations discussed  CKD (Cr 1.24 on 7/10/24) associated with increased complications risk.  DM and elective arthroplasty req A1c <7.5 (7.2 on 7/10/24)  Requirement to stop nicotine 6wks before elective surgery.  Asa allergy (facial swelling)  Cobalt allergy (rash, pruritus)  On plavix for CVA  Occupation: Retired ()  Hobbies: [employer], [id number], exercises  Follow-up: As  needed.

## 2024-08-24 ASSESSMENT — CUP TO DISC RATIO
OD_RATIO: .4
OS_RATIO: .4

## 2024-08-24 ASSESSMENT — EXTERNAL EXAM - RIGHT EYE: OD_EXAM: NORMAL

## 2024-08-24 ASSESSMENT — SLIT LAMP EXAM - LIDS
COMMENTS: GOOD POSITION
COMMENTS: GOOD POSITION

## 2024-08-24 ASSESSMENT — EXTERNAL EXAM - LEFT EYE: OS_EXAM: NORMAL

## 2024-08-25 NOTE — PROGRESS NOTES
LOV 8/19/19      Epiretinal membrane (ERM) of both eyesH35.373  -OCT Macula (8/26/24) - SS: 7/10 OU. ERM OD>OS. OD with pseudocyst/lamellar changes. 291/255 (worse OD compared to 8/19/19).  -May be mildly visually significant OD - will plan to refer to retina service for evaluation. F/u with me in 1 year for comprehensive exam with OCT macula.     Controlled diabetes mellitus type II without [id number]  -HbA1c= 7.2 (7/10/24). No diabetic retinopathy on dilated exam today. Continue close monitoring of blood glucose, blood pressure, and cholesterol. Plan for annual dilated eye exam.    Pseudophakia of both eyesZ96.1  -Stable. Monitor.     [id number]  -New Rx given

## 2024-08-26 ENCOUNTER — APPOINTMENT (OUTPATIENT)
Dept: OPHTHALMOLOGY | Facility: CLINIC | Age: 84
End: 2024-08-26
Payer: MEDICARE

## 2024-08-26 DIAGNOSIS — H52.203 ASTIGMATISM OF BOTH EYES, UNSPECIFIED TYPE: ICD-10-CM

## 2024-08-26 DIAGNOSIS — Z96.1 PSEUDOPHAKIA: ICD-10-CM

## 2024-08-26 DIAGNOSIS — H35.373 EPIRETINAL MEMBRANE (ERM) OF BOTH EYES: Primary | ICD-10-CM

## 2024-08-26 DIAGNOSIS — E11.9 DIABETES MELLITUS WITHOUT COMPLICATION (MULTI): ICD-10-CM

## 2024-08-26 PROBLEM — H25.813 COMBINED FORM OF AGE-RELATED CATARACT, BOTH EYES: Status: ACTIVE | Noted: 2024-08-26

## 2024-08-26 PROCEDURE — 92134 CPTRZ OPH DX IMG PST SGM RTA: CPT | Performed by: OPHTHALMOLOGY

## 2024-08-26 PROCEDURE — 92015 DETERMINE REFRACTIVE STATE: CPT | Performed by: OPHTHALMOLOGY

## 2024-08-26 PROCEDURE — 92004 COMPRE OPH EXAM NEW PT 1/>: CPT | Performed by: OPHTHALMOLOGY

## 2024-08-26 ASSESSMENT — REFRACTION_WEARINGRX
OD_CYLINDER: -2.00
OS_ADD: +2.50
OS_SPHERE: -0.50
OD_AXIS: 080
OS_AXIS: 090
OD_ADD: +2.50
OS_CYLINDER: -2.00
OD_SPHERE: +0.75

## 2024-08-26 ASSESSMENT — REFRACTION
OD_CYLINDER: -2.25
OS_CYLINDER: -2.25
OS_SPHERE: PLANO
OS_AXIS: 085
OD_AXIS: 085
OD_ADD: +2.75
OD_SPHERE: -0.25
OS_ADD: +2.75

## 2024-08-26 ASSESSMENT — ENCOUNTER SYMPTOMS
ALLERGIC/IMMUNOLOGIC NEGATIVE: 0
RESPIRATORY NEGATIVE: 0
HEMATOLOGIC/LYMPHATIC NEGATIVE: 0
CONSTITUTIONAL NEGATIVE: 0
EYES NEGATIVE: 1
CARDIOVASCULAR NEGATIVE: 0
PSYCHIATRIC NEGATIVE: 0
NEUROLOGICAL NEGATIVE: 0
ENDOCRINE NEGATIVE: 0
MUSCULOSKELETAL NEGATIVE: 0
GASTROINTESTINAL NEGATIVE: 0

## 2024-08-26 ASSESSMENT — TONOMETRY
OS_IOP_MMHG: 12
OD_IOP_MMHG: 11
IOP_METHOD: GOLDMANN APPLANATION

## 2024-08-26 ASSESSMENT — REFRACTION_MANIFEST
OD_ADD: +2.50
OS_SPHERE: PLANO
OD_AXIS: 080
OS_AXIS: 090
OS_CYLINDER: -2.00
OD_CYLINDER: -2.00
OS_ADD: +2.50
OD_SPHERE: +0.25

## 2024-08-26 ASSESSMENT — VISUAL ACUITY
OS_CC: 20/50
OD_CC: 20/50
CORRECTION_TYPE: GLASSES
METHOD: SNELLEN - LINEAR

## 2024-10-16 ENCOUNTER — APPOINTMENT (OUTPATIENT)
Dept: GERIATRIC MEDICINE | Facility: CLINIC | Age: 84
End: 2024-10-16
Payer: MEDICARE

## 2024-10-23 PROBLEM — R10.9 RIGHT FLANK PAIN: Status: RESOLVED | Noted: 2024-10-23 | Resolved: 2024-10-23

## 2024-10-23 PROBLEM — M81.0 OSTEOPOROSIS: Status: ACTIVE | Noted: 2024-10-23

## 2024-10-23 PROBLEM — M79.89 SWELLING OF UPPER EXTREMITY: Status: ACTIVE | Noted: 2024-10-23

## 2024-10-23 PROBLEM — N28.1 RENAL CYST, RIGHT: Status: ACTIVE | Noted: 2024-10-23

## 2024-10-23 PROBLEM — Z96.1 PSEUDOPHAKIA OF BOTH EYES: Status: ACTIVE | Noted: 2024-10-23

## 2024-10-23 PROBLEM — M54.2 NECK PAIN: Status: ACTIVE | Noted: 2024-10-23

## 2024-10-23 PROBLEM — M77.8 TENDINITIS OF ELBOW OR FOREARM: Status: ACTIVE | Noted: 2024-10-23

## 2024-10-23 PROBLEM — R10.12 ABDOMINAL PAIN, LUQ (LEFT UPPER QUADRANT): Status: RESOLVED | Noted: 2024-10-23 | Resolved: 2024-10-23

## 2024-10-23 PROBLEM — H90.3 SENSORINEURAL HEARING LOSS, BILATERAL: Status: ACTIVE | Noted: 2024-10-23

## 2024-10-23 PROBLEM — K63.9 DISORDER OF INTESTINE: Status: ACTIVE | Noted: 2024-10-23

## 2024-10-23 PROBLEM — I63.9 CEREBROVASCULAR ACCIDENT (CVA) (MULTI): Status: ACTIVE | Noted: 2024-10-23

## 2024-10-23 PROBLEM — Z33.2 UNSPECIFIED LEGALLY INDUCED ABORTION WITHOUT MENTION OF COMPLICATION: Status: RESOLVED | Noted: 2024-10-23 | Resolved: 2024-10-23

## 2024-10-23 PROBLEM — R32 URINARY INCONTINENCE: Status: ACTIVE | Noted: 2024-10-23

## 2024-10-23 PROBLEM — M19.90 OSTEOARTHRITIS: Status: ACTIVE | Noted: 2024-10-23

## 2024-10-23 PROBLEM — K29.70 GASTRITIS: Status: ACTIVE | Noted: 2024-10-23

## 2024-10-23 PROBLEM — M79.10 MUSCLE PAIN: Status: ACTIVE | Noted: 2024-10-23

## 2024-10-23 PROBLEM — R23.8 VESICULAR RASH: Status: RESOLVED | Noted: 2024-10-23 | Resolved: 2024-10-23

## 2024-10-23 PROBLEM — M77.8 ELBOW TENDONITIS: Status: ACTIVE | Noted: 2024-10-23

## 2024-10-23 PROBLEM — H90.3 ASYMMETRICAL SENSORINEURAL HEARING LOSS: Status: ACTIVE | Noted: 2024-10-23

## 2024-10-23 PROBLEM — K59.00 CONSTIPATION: Status: ACTIVE | Noted: 2017-10-31

## 2024-10-23 PROBLEM — E78.5 HYPERLIPIDEMIA: Status: ACTIVE | Noted: 2024-10-23

## 2024-10-23 PROBLEM — I69.90 LATE EFFECTS OF CEREBROVASCULAR DISEASE: Status: ACTIVE | Noted: 2024-10-23

## 2024-10-23 PROBLEM — R41.3 MEMORY LOSS: Status: ACTIVE | Noted: 2024-10-23

## 2024-10-23 PROBLEM — H35.379 EPIRETINAL MEMBRANE: Status: ACTIVE | Noted: 2024-10-23

## 2024-10-23 PROBLEM — N28.9 DISORDER OF KIDNEY: Status: ACTIVE | Noted: 2024-10-23
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
## 2024-10-23 PROBLEM — I10 HYPERTENSION: Status: ACTIVE | Noted: 2024-10-23

## 2024-10-23 PROBLEM — R61 EXCESSIVE SWEATING: Status: ACTIVE | Noted: 2024-10-23

## 2024-10-23 PROBLEM — E55.9 VITAMIN D DEFICIENCY: Status: ACTIVE | Noted: 2024-10-23

## 2024-10-23 PROBLEM — R19.00 ABDOMINAL SWELLING: Status: ACTIVE | Noted: 2024-10-23

## 2024-10-23 PROBLEM — H25.11 AGE-RELATED NUCLEAR CATARACT OF RIGHT EYE: Status: ACTIVE | Noted: 2024-10-23

## 2024-10-23 PROBLEM — H93.A2 SUBJECTIVE PULSATILE TINNITUS OF LEFT EAR: Status: ACTIVE | Noted: 2024-10-23

## 2024-10-23 PROBLEM — E11.9 DIET-CONTROLLED DIABETES MELLITUS (MULTI): Status: ACTIVE | Noted: 2024-10-23

## 2024-10-23 PROBLEM — E11.65 POORLY CONTROLLED TYPE 2 DIABETES MELLITUS (MULTI): Status: ACTIVE | Noted: 2024-10-23

## 2024-10-23 PROBLEM — R22.32 LOCALIZED SWELLING ON LEFT HAND: Status: ACTIVE | Noted: 2024-10-23

## 2024-10-23 PROBLEM — N18.9 CHRONIC KIDNEY DISEASE: Status: ACTIVE | Noted: 2024-01-04

## 2024-10-23 PROBLEM — M20.60 DEFORMITY OF TOE: Status: ACTIVE | Noted: 2024-10-23

## 2024-10-23 PROBLEM — N28.9 RENAL DISORDER: Status: ACTIVE | Noted: 2024-10-23

## 2024-10-23 PROBLEM — R61: Status: RESOLVED | Noted: 2024-10-23 | Resolved: 2024-10-23

## 2024-10-23 PROBLEM — N28.1 CYST OF KIDNEY, ACQUIRED: Status: ACTIVE | Noted: 2024-10-23

## 2024-10-23 PROBLEM — M10.9 GOUT: Status: ACTIVE | Noted: 2024-10-23

## 2024-10-23 PROBLEM — Z87.39 PERSONAL HISTORY OF ARTHRITIS: Status: ACTIVE | Noted: 2024-10-23

## 2024-10-29 ENCOUNTER — OFFICE VISIT (OUTPATIENT)
Dept: GERIATRIC MEDICINE | Facility: CLINIC | Age: 84
End: 2024-10-29
Payer: MEDICARE

## 2024-10-29 VITALS
BODY MASS INDEX: 22.8 KG/M2 | WEIGHT: 132.8 LBS | HEART RATE: 89 BPM | DIASTOLIC BLOOD PRESSURE: 73 MMHG | TEMPERATURE: 95.9 F | SYSTOLIC BLOOD PRESSURE: 133 MMHG | RESPIRATION RATE: 18 BRPM

## 2024-10-29 DIAGNOSIS — N18.9 CHRONIC KIDNEY DISEASE, UNSPECIFIED CKD STAGE: ICD-10-CM

## 2024-10-29 DIAGNOSIS — M15.0 PRIMARY OSTEOARTHRITIS INVOLVING MULTIPLE JOINTS: ICD-10-CM

## 2024-10-29 DIAGNOSIS — H90.3 SENSORINEURAL HEARING LOSS (SNHL) OF BOTH EARS: ICD-10-CM

## 2024-10-29 DIAGNOSIS — G47.00 INSOMNIA, UNSPECIFIED TYPE: ICD-10-CM

## 2024-10-29 DIAGNOSIS — R41.89 COGNITIVE IMPAIRMENT: ICD-10-CM

## 2024-10-29 DIAGNOSIS — E55.9 VITAMIN D INSUFFICIENCY: ICD-10-CM

## 2024-10-29 DIAGNOSIS — N18.32 TYPE 2 DIABETES MELLITUS WITH STAGE 3B CHRONIC KIDNEY DISEASE, WITHOUT LONG-TERM CURRENT USE OF INSULIN (MULTI): Primary | ICD-10-CM

## 2024-10-29 DIAGNOSIS — Z00.00 HEALTHCARE MAINTENANCE: ICD-10-CM

## 2024-10-29 DIAGNOSIS — Z86.73 HISTORY OF STROKE: ICD-10-CM

## 2024-10-29 DIAGNOSIS — I10 PRIMARY HYPERTENSION: ICD-10-CM

## 2024-10-29 DIAGNOSIS — E11.22 TYPE 2 DIABETES MELLITUS WITH STAGE 3B CHRONIC KIDNEY DISEASE, WITHOUT LONG-TERM CURRENT USE OF INSULIN (MULTI): Primary | ICD-10-CM

## 2024-10-29 PROCEDURE — 80069 RENAL FUNCTION PANEL: CPT | Performed by: INTERNAL MEDICINE

## 2024-10-29 PROCEDURE — 36415 COLL VENOUS BLD VENIPUNCTURE: CPT | Performed by: INTERNAL MEDICINE

## 2024-10-29 PROCEDURE — 3075F SYST BP GE 130 - 139MM HG: CPT | Performed by: INTERNAL MEDICINE

## 2024-10-29 PROCEDURE — 83036 HEMOGLOBIN GLYCOSYLATED A1C: CPT | Performed by: INTERNAL MEDICINE

## 2024-10-29 PROCEDURE — 1125F AMNT PAIN NOTED PAIN PRSNT: CPT | Performed by: INTERNAL MEDICINE

## 2024-10-29 PROCEDURE — G2211 COMPLEX E/M VISIT ADD ON: HCPCS | Performed by: INTERNAL MEDICINE

## 2024-10-29 PROCEDURE — 82306 VITAMIN D 25 HYDROXY: CPT | Performed by: INTERNAL MEDICINE

## 2024-10-29 PROCEDURE — 99215 OFFICE O/P EST HI 40 MIN: CPT | Performed by: INTERNAL MEDICINE

## 2024-10-29 PROCEDURE — 1036F TOBACCO NON-USER: CPT | Performed by: INTERNAL MEDICINE

## 2024-10-29 PROCEDURE — 3078F DIAST BP <80 MM HG: CPT | Performed by: INTERNAL MEDICINE

## 2024-10-29 PROCEDURE — 85027 COMPLETE CBC AUTOMATED: CPT | Performed by: INTERNAL MEDICINE

## 2024-10-29 ASSESSMENT — PATIENT HEALTH QUESTIONNAIRE - PHQ9
SUM OF ALL RESPONSES TO PHQ9 QUESTIONS 1 AND 2: 0
1. LITTLE INTEREST OR PLEASURE IN DOING THINGS: NOT AT ALL
2. FEELING DOWN, DEPRESSED OR HOPELESS: NOT AT ALL

## 2024-10-29 ASSESSMENT — ENCOUNTER SYMPTOMS
LOSS OF SENSATION IN FEET: 0
OCCASIONAL FEELINGS OF UNSTEADINESS: 0

## 2024-10-29 ASSESSMENT — PAIN SCALES - GENERAL: PAINLEVEL_OUTOF10: 8

## 2024-10-30 LAB
25(OH)D3 SERPL-MCNC: 34 NG/ML (ref 30–100)
ALBUMIN SERPL BCP-MCNC: 4.4 G/DL (ref 3.4–5)
ANION GAP SERPL CALC-SCNC: 14 MMOL/L (ref 10–20)
BUN SERPL-MCNC: 16 MG/DL (ref 6–23)
CALCIUM SERPL-MCNC: 9.6 MG/DL (ref 8.6–10.6)
CHLORIDE SERPL-SCNC: 107 MMOL/L (ref 98–107)
CO2 SERPL-SCNC: 26 MMOL/L (ref 21–32)
CREAT SERPL-MCNC: 1.56 MG/DL (ref 0.5–1.05)
EGFRCR SERPLBLD CKD-EPI 2021: 32 ML/MIN/1.73M*2
ERYTHROCYTE [DISTWIDTH] IN BLOOD BY AUTOMATED COUNT: 13 % (ref 11.5–14.5)
EST. AVERAGE GLUCOSE BLD GHB EST-MCNC: 160 MG/DL
GLUCOSE SERPL-MCNC: 153 MG/DL (ref 74–99)
HBA1C MFR BLD: 7.2 %
HCT VFR BLD AUTO: 38.9 % (ref 36–46)
HGB BLD-MCNC: 12.8 G/DL (ref 12–16)
MCH RBC QN AUTO: 30.8 PG (ref 26–34)
MCHC RBC AUTO-ENTMCNC: 32.9 G/DL (ref 32–36)
MCV RBC AUTO: 94 FL (ref 80–100)
NRBC BLD-RTO: 0 /100 WBCS (ref 0–0)
PHOSPHATE SERPL-MCNC: 3.9 MG/DL (ref 2.5–4.9)
PLATELET # BLD AUTO: 258 X10*3/UL (ref 150–450)
POTASSIUM SERPL-SCNC: 4.1 MMOL/L (ref 3.5–5.3)
RBC # BLD AUTO: 4.16 X10*6/UL (ref 4–5.2)
SODIUM SERPL-SCNC: 143 MMOL/L (ref 136–145)
WBC # BLD AUTO: 3.8 X10*3/UL (ref 4.4–11.3)

## 2024-11-01 ENCOUNTER — APPOINTMENT (OUTPATIENT)
Dept: OPHTHALMOLOGY | Facility: CLINIC | Age: 84
End: 2024-11-01
Payer: MEDICARE

## 2024-11-01 DIAGNOSIS — H35.373 EPIRETINAL MEMBRANE (ERM) OF BOTH EYES: ICD-10-CM

## 2024-11-01 DIAGNOSIS — E11.3211 MILD NONPROLIFERATIVE DIABETIC RETINOPATHY OF RIGHT EYE WITH MACULAR EDEMA ASSOCIATED WITH TYPE 2 DIABETES MELLITUS: Primary | ICD-10-CM

## 2024-11-01 PROCEDURE — 99214 OFFICE O/P EST MOD 30 MIN: CPT

## 2024-11-01 PROCEDURE — 92134 CPTRZ OPH DX IMG PST SGM RTA: CPT

## 2024-11-01 ASSESSMENT — CUP TO DISC RATIO
OS_RATIO: .4
OD_RATIO: .4

## 2024-11-01 ASSESSMENT — VISUAL ACUITY
METHOD: SNELLEN - LINEAR
OD_CC: 20/30
CORRECTION_TYPE: GLASSES
OD_CC+: -1
OS_CC: 20/30
OS_CC+: -1

## 2024-11-01 ASSESSMENT — TONOMETRY
OS_IOP_MMHG: 14
IOP_METHOD: GOLDMANN APPLANATION
OD_IOP_MMHG: 14

## 2024-11-01 ASSESSMENT — ENCOUNTER SYMPTOMS: EYES NEGATIVE: 1

## 2024-11-01 ASSESSMENT — EXTERNAL EXAM - LEFT EYE: OS_EXAM: NORMAL

## 2024-11-01 ASSESSMENT — SLIT LAMP EXAM - LIDS
COMMENTS: GOOD POSITION
COMMENTS: GOOD POSITION

## 2024-11-01 ASSESSMENT — EXTERNAL EXAM - RIGHT EYE: OD_EXAM: NORMAL

## 2024-11-08 ENCOUNTER — TELEMEDICINE (OUTPATIENT)
Dept: PHARMACY | Facility: HOSPITAL | Age: 84
End: 2024-11-08
Payer: MEDICARE

## 2024-11-08 DIAGNOSIS — H90.3 SENSORINEURAL HEARING LOSS (SNHL) OF BOTH EARS: ICD-10-CM

## 2024-11-08 DIAGNOSIS — E11.22 TYPE 2 DIABETES MELLITUS WITH STAGE 3B CHRONIC KIDNEY DISEASE, WITHOUT LONG-TERM CURRENT USE OF INSULIN (MULTI): ICD-10-CM

## 2024-11-08 DIAGNOSIS — N18.32 TYPE 2 DIABETES MELLITUS WITH STAGE 3B CHRONIC KIDNEY DISEASE, WITHOUT LONG-TERM CURRENT USE OF INSULIN (MULTI): ICD-10-CM

## 2024-11-08 NOTE — PROGRESS NOTES
"Outpatient Geriatrics Clinical Pharmacy Visit  Tanya Lance is a 84 y.o. female who was referred to the ambulatory Clinical Pharmacy Team for their Diabetes.    Referring Provider: Justine Baumann MD  Next appointment: 2/25/25    Preferred Pharmacy  [facility] - [place], [place] - [address]  [address]  [address]  [facility] [address]  Phone: [phone] Fax: [phone]    [id number] [address] & [address] [address]  Phone: [phone] Fax: [phone]    Medication Access  Cost barriers: None, no copays on existing medications  Enrolled in  PAP: No  Manages own medication: No  Transportation to the pharmacy: N/A, mail-order  Frequency of missed doses: 7/7 days    Renal/Hepatic Dosing  Current GFR (reported): 32 mL/min/1.73 m2 as of 10/29/24  Current CrCl (calculated): 22.4 mL/min as of 10/29/24 labs  History of cirrhosis or hepatic dysfunction? No, however, positive history for hepatitis B. Family history of hepatic failure.     History of Care  11/8/24- Establishing with Clinical Pharmacy     DIABETES ASSESSMENT   Diagnosed with diabetes in 2015, patient guesses.     Current Diabetes Medication Regimen    Glipizide IR 5 mg, 1.5 tabs QAM and 2 tabs QPM- per patient, has recently been decreased to 5 mg, 10 mg.     Secondary Prevention  Statin? No  ACE-I/ARB? No  Aspirin? 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\", as of 11/1/24 visit  PMH/FH of Medullary Thyroid Carcinoma or Multiple Endocrine Neoplasia Type 2: No  PMH of Urinary Tract Infections: No    Health Maintenance:   Foot Exam: Sees podiatrist regularly ([facility]); goes every 3 months  Eye Exam: Up to date; recently saw retina specialist 11/1/24  Lipid Panel: LDL 51 mg/dL and triglycerides 78 mg/dL   Urine " "Albumin/Creatinine Ratio: No labs    DIET:   Wake up (4am or later, 8am if taking MidNite supplement the night before)  Breakfast (10am-12am): Bowl of cheerios, yogurt, scrambled egg, toast, rarely farmer, coffee, banana  Lunch (earliest 1am, latest 3pm): Turkey or chicken sandwich (deli meat), soup+salad, peanut butter and jelly, milk (oat milk)  Dinner (no later than 8pm): Grilled chicken, yukon gold potatoes, salad, green beans, fresh spinach, sometimes a ground chicken meatloaf with rice, applesauce, sometimes spaghetti   Bedtime (varies, will go to sleep watching TV and wake up later) If taking MidNite supplement around 10 pm, will \"sleep like a baby\"    Won't take morning medications until she eats, sometimes will drink a cup of coffee and will take blood pressure pill    EXERCISE: Unable to discuss due to time    Current monitoring regimen:   Patient is using: No devices  Testing frequency: Never; relies only on A1c.   Barriers to testing: Yes. Patient does not routinely check her blood sugar due to both frustration with testing and due to the process bringing back memories of her son, also diagnosed with diabetes, who has since passed away.     Patient-Reported Blood Glucose:  No blood sugar to report due to above barriers.     Has the patient experienced any low sugars since last contact? Unknown  reports some symptoms of low blood glucose: lightheadedness approximately 2-3 times monthly if not eating until noon; of note, will not take glipizide until after she eats.   Has the patient experienced any high sugars since last contact? no  denies symptoms of high blood glucose: excessive thirst, frequent urination, fatigue, nausea, shortness of breath, trouble concentrating    Diabetes Patient Education    PATIENT GOALS   Fasting B - 130 mg/dL 2-HR Postprandial BG:   Less than 180 mg/dL A1c:   Less than 8.0 %     [facility] PAP Eligibility Assessment  This patient has expressed a need for medication cost " assistance and will be considered for the  Patient Assistance Program ( PAP). If patient is approved, the below medications would result in a $0 cost/month for the patient. If approved, patient must then provide updated financial documents each calendar year and have a new prescription issued by a  Meds pharmacist to continue receiving medications at $0 cost.    Medications  Farxiga 5 mg and 10 mg- would cost approximately $47/month copay for patient presently    Eligibility Requirements  [x] Patient has pre-existing prescription coverage  [x] Medication(s) above are listed on  PAP formulary  [x] Income: Less than or equal to 400% Federal Poverty Limit  Household Persons: 1  [x] Patient's prescription insurance is contracted with either [facility] or  Specialty Pharmacy    2024* Poverty Guidelines for the [address] [place] [address] and [facility]   Persons in Family/Household Poverty Guideline Annual 400% Monthly 400%   1 $15,060 $60,240 $5,020   2 $20,440 $81,760 $6,813   3 $25,820 $103,280 $8,607   4 $31,200 $124,800 $10,400   5 $36,580 $146,320 $12,193   6 $41,960 $167,840 $13,987   7 $47,340 $189,360 $15,780   8 $52,720 $210,880 $17,573   *[facility] updates FPL guidelines in late January of each calendar year.      Initial Eligibility Determination  Note: Clinical Pharmacy is not responsible for finalizing eligibility approval. The  PAP team determines final eligibility.  This patient will likely  qualify for the  PAP program.    Financial Documents Required for Application Submission  [] Statement of Non-filing  [] Social Security Annual Benefits Letter    Historical Pharmacotherapy  Metformin    Drug Interactions  No major drug interactions    Laboratory Results  Lab Results   Component Value Date    BILITOT 0.5 04/29/2021    CALCIUM 9.6 10/29/2024    CO2 26 10/29/2024     10/29/2024    CREATININE 1.56 (H) 10/29/2024    GLUCOSE 153 (H) 10/29/2024    ALKPHOS 86 04/29/2021    K 4.1 10/29/2024     PROT 6.5 04/29/2021     10/29/2024    AST 16 04/29/2021    ALT 15 04/29/2021    BUN 16 10/29/2024    ANIONGAP 14 10/29/2024    PHOS 3.9 10/29/2024    ALBUMIN 4.4 10/29/2024    AMYLASE 98 04/29/2021    LIPASE 30 04/29/2021    GFRF 34 (A) 09/06/2023    EGFR 32 (L) 10/29/2024     Lab Results   Component Value Date    TRIG 78 04/10/2024    CHOL 124 04/10/2024    LDLCALC 51 04/10/2024    HDL 57.4 04/10/2024     Lab Results   Component Value Date    HGBA1C 7.2 (H) 10/29/2024    HGBA1C 7.2 (H) 07/10/2024    HGBA1C 7.6 (H) 01/10/2024       ASCVD Risk  The ASCVD Risk score (Basia ELIZABETH, et al., 2019) failed to calculate for the following reasons:    The 2019 ASCVD risk score is only valid for ages 40 to 79    Risk score cannot be calculated because patient has a medical history suggesting prior/existing ASCVD      Assessment/Plan   Problem List Items Addressed This Visit       Bilateral hearing loss    Relevant Orders    Referral to Clinical Pharmacy     Other Visit Diagnoses       Type 2 diabetes mellitus with stage 3b chronic kidney disease, without long-term current use of insulin (Multi)        Relevant Orders    Referral to Clinical Pharmacy          General Patient Discussion  DM/CKD  Patient's blood glucose is currently well-controlled for her age as evidenced by A1c of 7.2% as of 10/29/24 with glipizide IR 5 mg tabs, 1 tab QAM and 2 tabs QPM (per patient). Patient's current goal A1c is <8%.  From a safety standpoint, the risk of hypoglycemia with glipizide is greater than other newer antidiabetic medications, such as FYQJ3ct.   Per patient's concurrent diagnosis of CKD, would be an excellent candidate to pursue SGLT2i therapy with Farxiga/Jardiance.   Patient amenable to pursuing Farxiga 5 mg daily initially, with plans to up-titrate to 10 mg daily after 1 month if tolerating.   Farxiga Education:   Counseled patient on Farxiga MOA, expectations, side effects, duration of therapy, administration, and  monitoring parameters.  Reviewed the benefits of SGLT-2i therapy, such as glycemic control, kidney protection, and cardiovascular protection.  Advised patient to practice proper  hygiene to reduce risk of UTIs or yeast infections.  Advised patient to maintain adequate fluid intake to remain hydrated while on SGLT-2i therapy. If patient becomes acutely ill, characterized by decreased oral intake and/or increased volume loss due to diarrhea/vomiting, patient advised to abstain from Farxiga use until they have returned to baseline.   Answered all patient questions and concerns.    PAP  As cost of Farxiga expected at ~ PAP, patient would like to consider  PAP program to assist.   Patient will be dropping off copy of 2024 Social Security Benefits form at [facility]  Understands this pharmacist will fill out Statement of Nonfiling on her behalf.    Plan/Changes   Continue all meds under the continuation of care with the referring provider and clinical pharmacy team  Start Farxiga 5 mg daily, send to [facility]   Start  PAP application for patient  Once patient starts Farxiga 5 mg daily (pending  PAP approval): Patient is to reduce glipizide IR dosing to 0 tablets QAM and 1 tablet QPM. Patient verbally acknowledges, also understands that further titration up/down based on resulting future A1c will heavily drive dosing of IR glipizide and understands that titration may be delayed due to lack of daily BG testing.     Next Clinical Pharmacy Follow-Up  ~2 weeks   PAP updates (patient to bring Social Security Benefits Statement to [facility].       Latasha Caruso, PharmD     Verbal consent to manage patient's drug therapy was obtained from the patient. They were informed they may decline to participate or withdraw from participation in pharmacy services at any time.

## 2024-11-08 NOTE — Clinical Note
Will start Farxiga 5 mg daily with plans to titrate to 10 mg daily if tolerating for DM/CKD coverage. Because patient is not testing BG daily/at all, will be overly cautious with glipizide dosing- will cautiously down-titrate glipizide IR to 0 tablets QAM and 1 tablet QPM upon starting Farxiga daily. (Current 1 tab QAM, 2 tabs QPM). Likely eligible for  PAP coverage, have initiated process. Follow-up ~2 weeks.

## 2024-11-11 RX ORDER — DAPAGLIFLOZIN 5 MG/1
5 TABLET, FILM COATED ORAL DAILY
Qty: 30 TABLET | Refills: 1 | Status: SHIPPED | OUTPATIENT
Start: 2024-11-11 | End: 2025-01-10

## 2024-11-22 ENCOUNTER — APPOINTMENT (OUTPATIENT)
Dept: PHARMACY | Facility: HOSPITAL | Age: 84
End: 2024-11-22
Payer: MEDICARE

## 2024-11-22 DIAGNOSIS — H90.3 SENSORINEURAL HEARING LOSS (SNHL) OF BOTH EARS: ICD-10-CM

## 2024-11-22 DIAGNOSIS — N18.32 TYPE 2 DIABETES MELLITUS WITH STAGE 3B CHRONIC KIDNEY DISEASE, WITHOUT LONG-TERM CURRENT USE OF INSULIN (MULTI): Primary | ICD-10-CM

## 2024-11-22 DIAGNOSIS — E11.22 TYPE 2 DIABETES MELLITUS WITH STAGE 3B CHRONIC KIDNEY DISEASE, WITHOUT LONG-TERM CURRENT USE OF INSULIN (MULTI): Primary | ICD-10-CM

## 2024-11-22 NOTE — PROGRESS NOTES
Outpatient Geriatrics Clinical Pharmacy Visit  Tanya Lance is a 84 y.o. female who was referred to the ambulatory Clinical Pharmacy Team for their Diabetes.     Referring Provider: Justine Baumann MD  Next appointment: 2/25/25    Discussion  Brief follow-up with patient this morning regarding  PAP updates- patient's financial paperwork had [facility] and application submitted. We are still waiting on approval/denial for coverage of Farxiga.     Plan/Changes   Continue all meds under the continuation of care with the referring provider and clinical pharmacy team  Continue to monitor  PAP application status    Follow-up   Friday, Dec 6th at 9:00 for  PAP updates      Yisel EscaleraD

## 2024-11-25 PROCEDURE — RXMED WILLOW AMBULATORY MEDICATION CHARGE

## 2024-11-27 ENCOUNTER — PHARMACY VISIT (OUTPATIENT)
Dept: PHARMACY | Facility: CLINIC | Age: 84
End: 2024-11-27
Payer: COMMERCIAL

## 2024-12-06 ENCOUNTER — APPOINTMENT (OUTPATIENT)
Dept: PHARMACY | Facility: HOSPITAL | Age: 84
End: 2024-12-06
Payer: MEDICARE

## 2024-12-06 DIAGNOSIS — E11.9 TYPE 2 DIABETES MELLITUS WITHOUT COMPLICATION, UNSPECIFIED WHETHER LONG TERM INSULIN USE (MULTI): Primary | ICD-10-CM

## 2024-12-06 DIAGNOSIS — N18.32 TYPE 2 DIABETES MELLITUS WITH STAGE 3B CHRONIC KIDNEY DISEASE, WITHOUT LONG-TERM CURRENT USE OF INSULIN (MULTI): ICD-10-CM

## 2024-12-06 DIAGNOSIS — E11.22 TYPE 2 DIABETES MELLITUS WITH STAGE 3B CHRONIC KIDNEY DISEASE, WITHOUT LONG-TERM CURRENT USE OF INSULIN (MULTI): ICD-10-CM

## 2024-12-06 NOTE — PROGRESS NOTES
Outpatient Geriatrics Clinical Pharmacy Visit  Tanya Lance is a 84 y.o. female who was referred to the ambulatory Clinical Pharmacy Team for their Diabetes.    Referring Provider: Justine Baumann MD  Next appointment: 25    Medication Access  Cost barriers: None, no copays on existing medications  Enrolled in  PAP: No  Manages own medication: No  Transportation to the pharmacy: N/A, mail-order  Frequency of missed doses: 7/7 days     Renal/Hepatic Dosing  Current GFR (reported): 32 mL/min/1.73 m2 as of 10/29/24  Current CrCl (calculated): 22.4 mL/min as of 10/29/24 labs  History of cirrhosis or hepatic dysfunction? No, however, positive history for hepatitis B. Family history of hepatic failure.      History of Care  24- Establishing with Clinical Pharmacy, started  PAP application for Farxiga  24-  PAP application for Farxiga approved    Patient Updates  Since last Clinical Pharmacy visit, patient has been approved for  PAP and enrollment will  2025. Patient has since received Farxiga, but has not yet started taking due to uncertainty surrounding medication changes.     Reiterated today with patient that she will:   START Farxiga 5 mg daily   STOP morning dose of glipizide IR 5 mg   CHANGE evening dose of glipizide IR 5 mg to 1 tablet at bedtime    Reviewed importance of maintaining optimal urinary/genital hygiene while on Farxiga due to increased risk of UTI/yeast infections. Patient to immediately report any itching, pain, burning with urination (etc).    Also reviewed signs/symptoms of hypoglycemia. Patient to immediately report.    Patient voiced understanding and will call if questions/concerns.     DIABETES ASSESSMENT   Diagnosed with diabetes in , patient estimates.     Current Diabetes Medication Regimen    Glipizide IR 5 mg, 1.5 tabs QAM and 2 tabs QPM- per patient, has recently been decreased to 5 mg, 10 mg.      Secondary Prevention  Statin? No  ACE-I/ARB?  "No  Aspirin? 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\", as of 11/1/24 visit  PMH/FH of Medullary Thyroid Carcinoma or Multiple Endocrine Neoplasia Type 2: No  PMH of Urinary Tract Infections: No     Health Maintenance:   Foot Exam: Sees podiatrist regularly ([facility]); goes every 3 months  Eye Exam: Up to date; recently saw retina specialist 11/1/24  Lipid Panel: LDL 51 mg/dL and triglycerides 78 mg/dL   Urine Albumin/Creatinine Ratio: No labs     DIET:   Wake up (4am or later, 8am if taking MidNite supplement the night before)  Breakfast (10am-12am): Bowl of cheerios, yogurt, scrambled egg, toast, rarely farmer, coffee, banana  Lunch (earliest 1am, latest 3pm): Turkey or chicken sandwich (deli meat), soup+salad, peanut butter and jelly, milk (oat milk)  Dinner (no later than 8pm): Grilled chicken, yukon gold potatoes, salad, green beans, fresh spinach, sometimes a ground chicken meatloaf with rice, applesauce, sometimes spaghetti   Bedtime (varies, will go to sleep watching TV and wake up later) If taking MidNite supplement around 10 pm, will \"sleep like a baby\"     Won't take morning medications until she eats, sometimes will drink a cup of coffee and will take blood pressure pill     EXERCISE: Unable to discuss due to time     Current monitoring regimen:   Patient is using: No devices  Testing frequency: Never; relies only on A1c.   Barriers to testing: Yes. Patient does not routinely check her blood sugar due to both frustration with testing and due to the process bringing back memories of her son, also diagnosed with diabetes, who has since passed away.      Patient-Reported Blood Glucose:  No blood sugar to report due to above barriers.      Has the patient experienced any low sugars since last contact? Unknown  reports some symptoms of low blood glucose: lightheadedness approximately " 2-3 times monthly if not eating until noon; of note, will not take glipizide until after she eats.   Has the patient experienced any high sugars since last contact? no  denies symptoms of high blood glucose: excessive thirst, frequent urination, fatigue, nausea, shortness of breath, trouble concentrating     Diabetes Patient Education          PATIENT GOALS   Fasting B - 130 mg/dL 2-HR Postprandial BG:   Less than 180 mg/dL A1c:   Less than 8.0 %       PAP Eligibility Assessment      Laboratory Results  Lab Results   Component Value Date    BILITOT 0.5 2021    CALCIUM 9.6 10/29/2024    CO2 26 10/29/2024     10/29/2024    CREATININE 1.56 (H) 10/29/2024    GLUCOSE 153 (H) 10/29/2024    ALKPHOS 86 2021    K 4.1 10/29/2024    PROT 6.5 2021     10/29/2024    AST 16 2021    ALT 15 2021    BUN 16 10/29/2024    ANIONGAP 14 10/29/2024    PHOS 3.9 10/29/2024    ALBUMIN 4.4 10/29/2024    AMYLASE 98 2021    LIPASE 30 2021    GFRF 34 (A) 2023    EGFR 32 (L) 10/29/2024     Lab Results   Component Value Date    TRIG 78 04/10/2024    CHOL 124 04/10/2024    LDLCALC 51 04/10/2024    HDL 57.4 04/10/2024     Lab Results   Component Value Date    HGBA1C 7.2 (H) 10/29/2024    HGBA1C 7.2 (H) 07/10/2024    HGBA1C 7.6 (H) 01/10/2024       ASCVD Risk  The ASCVD Risk score (Basia ELIZABETH, et al., 2019) failed to calculate for the following reasons:    The 2019 ASCVD risk score is only valid for ages 40 to 79    Risk score cannot be calculated because patient has a medical history suggesting prior/existing ASCVD      Assessment/Plan   Problem List Items Addressed This Visit       DM w/o complication type II (Multi) - Primary    Relevant Orders    Referral to Clinical Pharmacy     Other Visit Diagnoses       Type 2 diabetes mellitus with stage 3b chronic kidney disease, without long-term current use of insulin (Multi)              Plan/Changes   Continue all meds under the  continuation of care with the referring provider and clinical pharmacy team    Next Clinical Pharmacy Follow-Up  ~2 weeks      Latasha Caruso PharmD     Verbal consent to manage patient's drug therapy was obtained from the patient. They were informed they may decline to participate or withdraw from participation in pharmacy services at any time.

## 2024-12-18 PROCEDURE — RXMED WILLOW AMBULATORY MEDICATION CHARGE

## 2024-12-20 ENCOUNTER — APPOINTMENT (OUTPATIENT)
Dept: PHARMACY | Facility: HOSPITAL | Age: 84
End: 2024-12-20
Payer: MEDICARE

## 2024-12-20 ENCOUNTER — PHARMACY VISIT (OUTPATIENT)
Dept: PHARMACY | Facility: CLINIC | Age: 84
End: 2024-12-20
Payer: COMMERCIAL

## 2024-12-20 DIAGNOSIS — H90.3 SENSORINEURAL HEARING LOSS (SNHL) OF BOTH EARS: ICD-10-CM

## 2024-12-20 DIAGNOSIS — E11.9 TYPE 2 DIABETES MELLITUS WITHOUT COMPLICATION, UNSPECIFIED WHETHER LONG TERM INSULIN USE (MULTI): Primary | ICD-10-CM

## 2024-12-20 NOTE — Clinical Note
"Patient has been approved for  PAP and has started taking Farxiga- notes significant decrease in likely symptomatic hypoglycemia- was taking glipizide IR after dinner, will now start taking before dinner. Reports feeling \"much better.\" Will follow closely- ~1.5 weeks. "

## 2024-12-20 NOTE — PROGRESS NOTES
Outpatient Geriatrics Clinical Pharmacy Visit  Tanya Lance is a 84 y.o. female who was referred to the ambulatory Clinical Pharmacy Team for their Diabetes.    Referring Provider: Justine Baumann MD  Next appointment: 2/25/25    Medication Access  Cost barriers: None, no copays on existing medications  Enrolled in  PAP: Yes  Manages own medication: No  Transportation to the pharmacy: N/A, mail-order  Frequency of missed doses: Rare; Takes medication 7/7 days each week     Renal/Hepatic Dosing  Current GFR (reported): 32 mL/min/1.73 m2 as of 10/29/24  Current CrCl (calculated): 22.4 mL/min as of 10/29/24 labs  History of cirrhosis or hepatic dysfunction? No, however, positive history for hepatitis B. Family history of hepatic failure.      History of Care  11/08/24- Establishing with Clinical Pharmacy, started  PAP application for Farxiga  11/25/24-  PAP application for Farxiga approved  12/07/24- Patient started Farxiga 5 mg daily    Patient Updates  Since last Clinical Pharmacy visit, patient has officially started Farxiga 5 mg daily as of 12/7/24. Farxiga going well- patient reports no itching, burning, or urgency with urination. Maintaining hydration- states drinking at least 72 ounces of water per day.     Feels in the past (before Farxiga) like she would have intermittent hypoglycemic events when she would get lightheaded, weak, and felt like she would pass out. States was happening at most twice daily, usually once daily. Also notes she was not eating.     Patient is feeling much better and notes she has only had 1 (presumed) low blood sugar event since last visit since starting Farxiga 5 mg daily, but cannot recall the events surrounding the hypoglycemia.     Of note, patient reports today that she has been taking glipizide IR 5 mg after dinner- discussed with patient taking 30 minutes before dinner to reduce chance of hypoglycemia. Patient agreeable and will adjust medication dosing schedule.     "  DIABETES ASSESSMENT   Diagnosed with diabetes in 2015, patient estimates.     Current Diabetes Medication Regimen    Farxiga 5 mg daily  Glipizide IR 5 mg QPM     Secondary Prevention  Statin? No  ACE-I/ARB? No  Aspirin? 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\", as of 11/1/24 visit  PMH/FH of Medullary Thyroid Carcinoma or Multiple Endocrine Neoplasia Type 2: No  PMH of Urinary Tract Infections: No     Health Maintenance:   Foot Exam: Sees podiatrist regularly ([facility]); goes every 3 months  Eye Exam: Up to date; recently saw retina specialist 11/1/24  Lipid Panel: LDL 51 mg/dL and triglycerides 78 mg/dL   Urine Albumin/Creatinine Ratio: No labs     DIET:   Wake up (4am or later, 8am if taking MidNite supplement the night before)  Breakfast (10am-12am): Bowl of cheerios, yogurt, scrambled egg, toast, rarely farmer, coffee, banana  Lunch (earliest 1am, latest 3pm): Turkey or chicken sandwich (deli meat), soup+salad, peanut butter and jelly, milk (oat milk)  Dinner (no later than 8pm): Grilled chicken, yukon gold potatoes, salad, green beans, fresh spinach, sometimes a ground chicken meatloaf with rice, applesauce, sometimes spaghetti   Bedtime (varies, will go to sleep watching TV and wake up later) If taking MidNite supplement around 10 pm, will \"sleep like a baby\"     Won't take morning medications until she eats, sometimes will drink a cup of coffee and will take blood pressure pill     EXERCISE: Unable to discuss due to time     Current monitoring regimen:   Patient is using: No devices  Testing frequency: Never; relies only on A1c.   Barriers to testing: Yes. Patient does not routinely check her blood sugar due to both frustration with testing and due to the process bringing back memories of her son, also diagnosed with diabetes, who has since passed away.      Patient-Reported " Blood Glucose:  No blood sugar to report due to above barriers.      Has the patient experienced any low sugars since last contact? Unknown  reports some symptoms of low blood glucose: lightheadedness approximately 2-3 times monthly if not eating until noon; of note, will not take glipizide until after she eats.   Has the patient experienced any high sugars since last contact? no  denies symptoms of high blood glucose: excessive thirst, frequent urination, fatigue, nausea, shortness of breath, trouble concentrating     Diabetes Patient Education             PATIENT GOALS   Fasting B - 130 mg/dL 2-HR Postprandial BG:   Less than 180 mg/dL A1c:   Less than 8.0 %      [facility] PAP Eligibility Assessment    Laboratory Results  Lab Results   Component Value Date    BILITOT 0.5 2021    CALCIUM 9.6 10/29/2024    CO2 26 10/29/2024     10/29/2024    CREATININE 1.56 (H) 10/29/2024    GLUCOSE 153 (H) 10/29/2024    ALKPHOS 86 2021    K 4.1 10/29/2024    PROT 6.5 2021     10/29/2024    AST 16 2021    ALT 15 2021    BUN 16 10/29/2024    ANIONGAP 14 10/29/2024    PHOS 3.9 10/29/2024    ALBUMIN 4.4 10/29/2024    AMYLASE 98 2021    LIPASE 30 2021    GFRF 34 (A) 2023    EGFR 32 (L) 10/29/2024     Lab Results   Component Value Date    TRIG 78 04/10/2024    CHOL 124 04/10/2024    LDLCALC 51 04/10/2024    HDL 57.4 04/10/2024     Lab Results   Component Value Date    HGBA1C 7.2 (H) 10/29/2024    HGBA1C 7.2 (H) 07/10/2024    HGBA1C 7.6 (H) 01/10/2024       ASCVD Risk  The ASCVD Risk score (Basia ELIZABETH, et al., 2019) failed to calculate for the following reasons:    The 2019 ASCVD risk score is only valid for ages 40 to 79    Risk score cannot be calculated because patient has a medical history suggesting prior/existing ASCVD      Assessment/Plan   Problem List Items Addressed This Visit       Bilateral hearing loss    Relevant Orders    Referral to Clinical Pharmacy    DM w/o  complication type II (Multi) - Primary    Relevant Orders    Referral to Clinical Pharmacy       Plan/Changes   Continue all meds under the continuation of care with the referring provider and clinical pharmacy team  Continue Farxiga 5 mg daily  Continue glipizide IR 5 mg QPM  Patient to begin taking ~30 minutes before dinner instead of after    Next Clinical Pharmacy Follow-Up  ~1.5 weeks  Follow-up on possible symptomatic hypoglycemia after adjusting timing of glipizide IR administration      Latasha Caruso, PharmD     Verbal consent to manage patient's drug therapy was obtained from the patient. They were informed they may decline to participate or withdraw from participation in pharmacy services at any time.

## 2024-12-30 ENCOUNTER — APPOINTMENT (OUTPATIENT)
Dept: PHARMACY | Facility: HOSPITAL | Age: 84
End: 2024-12-30
Payer: MEDICARE

## 2025-01-09 ENCOUNTER — APPOINTMENT (OUTPATIENT)
Dept: OPHTHALMOLOGY | Facility: CLINIC | Age: 85
End: 2025-01-09
Payer: MEDICARE

## 2025-01-13 ENCOUNTER — APPOINTMENT (OUTPATIENT)
Dept: PHARMACY | Facility: HOSPITAL | Age: 85
End: 2025-01-13
Payer: MEDICARE

## 2025-01-13 DIAGNOSIS — E11.9 TYPE 2 DIABETES MELLITUS WITHOUT COMPLICATION, UNSPECIFIED WHETHER LONG TERM INSULIN USE (MULTI): Primary | ICD-10-CM

## 2025-01-13 DIAGNOSIS — H90.3 SENSORINEURAL HEARING LOSS (SNHL) OF BOTH EARS: ICD-10-CM

## 2025-01-13 NOTE — PROGRESS NOTES
"Outpatient Geriatrics Clinical Pharmacy Visit  Tanya Lance is a 84 y.o. female who was referred to the ambulatory Clinical Pharmacy Team for their Diabetes.    Referring Provider: Justine Baumann MD  Next appointment: 2/25/25     Medication Access  Cost barriers: None, no copays on existing medications  Enrolled in  PAP: Yes, expires 11/25/25 (Farxiga)  Manages own medication: No  Transportation to the pharmacy: N/A, mail-order  Frequency of missed doses: Rare; Takes medication 7/7 days each week     Renal/Hepatic Dosing  Current GFR (reported): 32 mL/min/1.73 m2 as of 10/29/24  Current CrCl (calculated): 22.4 mL/min as of 10/29/24 labs  History of cirrhosis or hepatic dysfunction? No, however, positive history for hepatitis B. Family history of hepatic failure.      History of Care  11/08/24- Establishing with Clinical Pharmacy, started  PAP application for Farxiga  11/25/24-  PAP application for Farxiga approved  12/07/24- Patient started Farxiga 5 mg daily and decreased glipizide IR to 5 mg QPM only     Patient Updates  Patient states she has not had a single episode of symptomatic hypoglycemia since the last Clinical Pharmacy visit. She notes she is still feeling better after the change in her blood sugar medications (Feels more like herself). Denies any urgency/burning/frequency of urination with Farxiga, and also denies any vaginal itchiness.     DIABETES ASSESSMENT   Diagnosed with diabetes in 2015, patient estimates.     Current Diabetes Medication Regimen    Farxiga 5 mg daily  Glipizide IR 5 mg QPM     Secondary Prevention  Statin? No  ACE-I/ARB? No  Aspirin? 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\", as of 11/1/24 visit  PMH/FH of Medullary Thyroid Carcinoma or Multiple Endocrine Neoplasia Type 2: No  PMH of Urinary Tract Infections: No     Health Maintenance:   Foot Exam: Sees podiatrist " "regularly ([facility]); goes every 3 months  Eye Exam: Up to date; recently saw retina specialist 11/1/24  Lipid Panel: LDL 51 mg/dL and triglycerides 78 mg/dL   Urine Albumin/Creatinine Ratio: No labs     DIET:   Wake up (4am or later, 8am if taking MidNite supplement the night before)  Breakfast (10am-12am): Bowl of cheerios, yogurt, scrambled egg, toast, rarely farmer, coffee, banana  Lunch (earliest 1am, latest 3pm): Turkey or chicken sandwich (deli meat), soup+salad, peanut butter and jelly, milk (oat milk)  Dinner (no later than 8pm): Grilled chicken, yukon gold potatoes, salad, green beans, fresh spinach, sometimes a ground chicken meatloaf with rice, applesauce, sometimes spaghetti   Bedtime (varies, will go to sleep watching TV and wake up later) If taking MidNite supplement around 10 pm, will \"sleep like a baby\"     Won't take morning medications until she eats, sometimes will drink a cup of coffee and will take blood pressure pill     EXERCISE: Unable to discuss due to time     Current monitoring regimen:   Patient is using: No devices  Testing frequency: Never; relies only on A1c.   Barriers to testing: Yes. Patient does not routinely check her blood sugar due to both frustration with testing and due to the process bringing back memories of her son, also diagnosed with diabetes, who has since passed away.      Patient-Reported Blood Glucose:  No blood sugar to report due to above barriers.      Has the patient experienced any low sugars since last contact? No  Denies some symptoms of low blood glucose  Has the patient experienced any high sugars since last contact? No  denies symptoms of high blood glucose    Laboratory Results  Lab Results   Component Value Date    BILITOT 0.5 04/29/2021    CALCIUM 9.6 10/29/2024    CO2 26 10/29/2024     10/29/2024    CREATININE 1.56 (H) 10/29/2024    GLUCOSE 153 (H) 10/29/2024    ALKPHOS 86 04/29/2021    K 4.1 10/29/2024    PROT 6.5 04/29/2021    "  10/29/2024    AST 16 04/29/2021    ALT 15 04/29/2021    BUN 16 10/29/2024    ANIONGAP 14 10/29/2024    PHOS 3.9 10/29/2024    ALBUMIN 4.4 10/29/2024    AMYLASE 98 04/29/2021    LIPASE 30 04/29/2021    GFRF 34 (A) 09/06/2023    EGFR 32 (L) 10/29/2024     Lab Results   Component Value Date    TRIG 78 04/10/2024    CHOL 124 04/10/2024    LDLCALC 51 04/10/2024    HDL 57.4 04/10/2024     Lab Results   Component Value Date    HGBA1C 7.2 (H) 10/29/2024    HGBA1C 7.2 (H) 07/10/2024    HGBA1C 7.6 (H) 01/10/2024       ASCVD Risk  The ASCVD Risk score (Basia ELIZABETH, et al., 2019) failed to calculate for the following reasons:    The 2019 ASCVD risk score is only valid for ages 40 to 79    Risk score cannot be calculated because patient has a medical history suggesting prior/existing ASCVD      Assessment/Plan   Problem List Items Addressed This Visit       Bilateral hearing loss    DM w/o complication type II (Multi) - Primary    Relevant Orders    Referral to Clinical Pharmacy     General Patient Discussion  At this time, will continue patient's above noted diabetes pharmacotherapy regimen.  As patient does not test blood sugar, will wait until after her next follow-up with PCP Dr. Baumann to evaluate her updated A1c and determine any next steps related to diabetes pharmacotherapy.     Plan/Changes   Continue all meds under the continuation of care with the referring provider and clinical pharmacy team  Continue Farxiga 5 mg daily  Continue glipizide IR 5 mg, 30 minutes before dinner    Next Clinical Pharmacy Follow-Up  3/5/25  Updates to A1c  Adjust BG regimen accordingly      Latasha Caruso, Kush     Verbal consent to manage patient's drug therapy was obtained from the patient. They were informed they may decline to participate or withdraw from participation in pharmacy services at any time.

## 2025-01-13 NOTE — Clinical Note
Patient continues to report no symptomatic hypoglycemia- as she is not testing BG, will continue current regimen of Farxiga 5 mg daily and glipizide IR 5 mg QPM until her next follow-up with you on 2/25 to evaluate A1c. Will likely increase to Farxiga 10 mg daily at this time for renal benefit, may discontinue glipizide if at all possible pending A1c results.

## 2025-01-16 DIAGNOSIS — E11.22 TYPE 2 DIABETES MELLITUS WITH STAGE 3B CHRONIC KIDNEY DISEASE, WITHOUT LONG-TERM CURRENT USE OF INSULIN (MULTI): Primary | ICD-10-CM

## 2025-01-16 DIAGNOSIS — N18.32 TYPE 2 DIABETES MELLITUS WITH STAGE 3B CHRONIC KIDNEY DISEASE, WITHOUT LONG-TERM CURRENT USE OF INSULIN (MULTI): Primary | ICD-10-CM

## 2025-01-19 DIAGNOSIS — E11.22 TYPE 2 DIABETES MELLITUS WITH STAGE 3B CHRONIC KIDNEY DISEASE, WITHOUT LONG-TERM CURRENT USE OF INSULIN (MULTI): ICD-10-CM

## 2025-01-19 DIAGNOSIS — N18.32 TYPE 2 DIABETES MELLITUS WITH STAGE 3B CHRONIC KIDNEY DISEASE, WITHOUT LONG-TERM CURRENT USE OF INSULIN (MULTI): ICD-10-CM

## 2025-01-21 PROCEDURE — RXMED WILLOW AMBULATORY MEDICATION CHARGE

## 2025-01-21 RX ORDER — DAPAGLIFLOZIN 5 MG/1
5 TABLET, FILM COATED ORAL DAILY
Qty: 30 TABLET | Refills: 0 | Status: SHIPPED | OUTPATIENT
Start: 2025-01-21 | End: 2025-02-22

## 2025-01-23 ENCOUNTER — PHARMACY VISIT (OUTPATIENT)
Dept: PHARMACY | Facility: CLINIC | Age: 85
End: 2025-01-23
Payer: COMMERCIAL

## 2025-01-25 DIAGNOSIS — E78.5 HYPERLIPIDEMIA, UNSPECIFIED HYPERLIPIDEMIA TYPE: ICD-10-CM

## 2025-01-27 RX ORDER — ATORVASTATIN CALCIUM 10 MG/1
10 TABLET, FILM COATED ORAL DAILY
Qty: 100 TABLET | Refills: 2 | Status: SHIPPED | OUTPATIENT
Start: 2025-01-27

## 2025-02-18 DIAGNOSIS — N18.32 TYPE 2 DIABETES MELLITUS WITH STAGE 3B CHRONIC KIDNEY DISEASE, WITHOUT LONG-TERM CURRENT USE OF INSULIN (MULTI): ICD-10-CM

## 2025-02-18 DIAGNOSIS — E11.22 TYPE 2 DIABETES MELLITUS WITH STAGE 3B CHRONIC KIDNEY DISEASE, WITHOUT LONG-TERM CURRENT USE OF INSULIN (MULTI): ICD-10-CM

## 2025-02-19 PROCEDURE — RXMED WILLOW AMBULATORY MEDICATION CHARGE

## 2025-02-19 RX ORDER — DAPAGLIFLOZIN 5 MG/1
5 TABLET, FILM COATED ORAL DAILY
Qty: 30 TABLET | Refills: 0 | Status: SHIPPED | OUTPATIENT
Start: 2025-02-19 | End: 2025-03-23

## 2025-02-21 ENCOUNTER — PHARMACY VISIT (OUTPATIENT)
Dept: PHARMACY | Facility: CLINIC | Age: 85
End: 2025-02-21
Payer: COMMERCIAL

## 2025-02-24 NOTE — PROGRESS NOTES
"Subjective   Ms. Lance is 84 y.o. year old female and here for f/u of cognitive impairment, DM, CKD, HTN, history of CVA on plavix, constipation, osteoarthritis, hyperlipidemia, hearing loss. here alone.     Last visit 10/29/24  Per pt discussion/summary:   \"For pain:  - take tylenol extra strength: 2 tabs in the morning every day  - can also take 2 tabs in the afternoon  - take 2 tabs at nighttime. This will help you sleep  - salon paas patches with lidocaine- to put on shoulders and knee  - diclofenac (generic) 1% : apply 4 grams to knees, 2 to 3 times per day   - apply 2 grams to the right shoulder, 2 to 3 times per day  2. Go to [facility] to try different hearing aids or devices  - we used a pocket talker today  - hearing aids are a lot cheaper  3. I have referred you to our geriatrics pharmacist  - Latasha Caruso  - she will do a virtual visit with you  - to help get you on the best medicines for your diabetes  4. Follow up visit in 4 months  - with memory test   5. We kendall blood today\"    Saw ophtho 11/1/24  \"Mild nonproliferative diabetic retinopathy with macular edema in type II DM, right [id number]  - Hx of Diabetes 5 years  - Most recent HbA1c  7.2 (7/10/24).  - Hx of HTN  - No Hx of kidney disease  - OCT:  --- OD: Ci DME, HE  --- OS: Normal foveal contour and retinal laminations.   #Plan#:   - Emphasized the importance of blood glucose, BP, and cholestrol level control  - Discussed risks/benefits/alteranatives of treatment options. Patient want time to think about it   - Will get PA for CECILIA or ZUNILDA  - RTC in 2 months\"    Clinical pharmacy appt 11/8/24  \"Mild nonproliferative diabetic retinopathy with macular edema in type II DM, right [id number]  - Hx of Diabetes 5 years  - Most recent HbA1c  7.2 (7/10/24).  - Hx of HTN  - No Hx of kidney disease     - OCT:  --- OD: Ci DME, HE  --- OS: Normal foveal contour and retinal laminations.  #Plan#:   - Emphasized the importance of blood glucose, " "BP, and cholestrol level control  - Discussed risks/benefits/alteranatives of treatment options. Patient want time to think about it   - Will get PA for CECILIA or ZUNILDA  - RTC in 2 months\"    24  'Patient Updates  Since last Clinical Pharmacy visit, patient has been approved for  PAP and enrollment will  2025. Patient has since received Farxiga, but has not yet started taking due to uncertainty surrounding medication changes.   Reiterated today with patient that she will:              START Farxiga 5 mg daily              STOP morning dose of glipizide IR 5 mg              CHANGE evening dose of glipizide IR 5 mg to 1 tablet at bedtime\"    24  \"Plan/Changes   Continue all meds under the continuation of care with the referring provider and clinical pharmacy team  Continue Farxiga 5 mg daily  Continue glipizide IR 5 mg QPM  Patient to begin taking ~30 minutes before dinner instead of after  Next Clinical Pharmacy Follow-Up  ~1.5 weeks  Follow-up on possible symptomatic hypoglycemia after adjusting timing of glipizide IR administration\"    1/15/24  \"General Patient Discussion  At this time, will continue patient's above noted diabetes pharmacotherapy regimen.  As patient does not test blood sugar, will wait until after her next follow-up with PCP Dr. Baumann to evaluate her updated A1c and determine any next steps related to diabetes pharmacotherapy.   Plan/Changes   Continue all meds under the continuation of care with the referring provider and clinical pharmacy team  Continue Farxiga 5 mg daily  Continue glipizide IR 5 mg, 30 minutes before dinner\"    HPI     Per patient   - not doing well today.   - she is tired physically  - her dtr's boyfriend  2 months ago. Pt was staying with her dtr to help her out. She was helping her dtr pack up to move to new place. Helping her unpack. just came back to her own home on .   - been having more pain from joints due to doing a lot of work to help dtr. " Packing and unpacking. Lifting.   - Knees, hands, back have been hurting. Has been taking tylenol 500mg BID. No more than 2 tabs a day  - hands been waking her up at night. Got gloves for carpal tunnel and that helps her to go back to sleep.   - has had trouble staying asleep. Just started since has been helping dtr. Due to hands hurting  - going to go back to taking midnite supplement as needed  - feels better on new diabetes regimen. Noticed a big changes on her body  - drinking more water   - taking farxiga in morning and glipizide 5mg 1 tab, 30 mins before eating  - memory has been good  - has missed night pills when at dtrs  - appetite has been decreased  - down 10 lbs in 1 year. And down 14 lbs compared to August. And down 6 lbs since october  - family concerned about the weight loss. Family bringing her food. Will eat with her and take her out.   - needs to cook more for herself. Denies  Leaving stove on or burning pots    Pill bottle review  - using pill box per day.   - glipizide 3/19/24. For 300 tabs, but pt taking less than prescribed on this bottle  - farxiga 1/22/25 for 30 tabs. Has 7 tabs left. Just got new bottle 2/24/25  - clopidogrel 75mg - filled 12/9/24 for 100 tabs. Good amount left. Over 20.   - amlodipine 2.5mg. filled 11/18/24 for 300 tabs. Has over 30 left  - atorvastatin filled 11/1 gfor 300.  Probably close to 30 left    - stopped [employer] because they were overshipping. Ghetting meds before needed it.     Home environment:   -senior living (Mother Ryann Armendariz- Senior IL in [place] single bedroom apartment, 12 yrs).   -pt involved in community events ([employer], [employer])     Alcohol: no  Smoking: no. Quit 15 yuears ago      Is dependent or requires assistance in the following BADL: independent   Is dependent or requires assistance in the following Instrumental ADL: does not drive. Gave it up 20 years ago., Otherwise independent.   -no burning pans but had left burners/oven on in the past (took  down the notes).     History of Abuse/Neglect/exploitation: denies concern with family, no scams from strangers.      Living Will: yes  Durable Power of  of Health Care: Dtr (Paola Lance, middle and only living child).     Medications reviewed and reconciled.     Objective   /71   Pulse 98   Temp 35.4 °C (95.7 °F) (Tympanic)   Wt 57.5 kg (126 lb 11.2 oz)   BMI 21.75 kg/m²     Physical Exam  Constitutional: No Acute Distress; Well Kempt  Eyes: PERRLA  Ears: auditory canals clear, TM's +LR b/l  ENT: Pharynx clear, neck supple  Lymphatic: No anterior cervical, supraclavicular adenopathy  Cardiovascular: RRR, +S1, S2, no murmurs appreciated, physiologic JVD.  Extremities: no cyanosis, clubbing, no edema. Pedal pulses intact  Respiratory: clear without rales, rhonchi or wheezes  Gastrointestinal: +BS, soft, nontender  Genitourinary: not examined  Musculoskeletal: some sarcopenia. Some kyphosis of spine  Integumentary: skin warm, no tenting, no remarkable lesions  Neurological: non-focal deficit. Get-up-and-go: slightly pushes to stand   Gait: stable mostly  without device  Psychiatric: affect full    MoCA 25/30 on Version 7.2.  grad  MoCA  Visuospatial/Executive: 2 (missed mini trails. rectangular prism was reverse orientation. got [place] and numbers on clock. hands not correct)  Naming: 3  Memory (Score '0' as this is an Unscored Section): 0  Attention: Read List of Digits: 2  Attention: Read List of Letters: 1  Attention: Serial Sevens: 1 (1 correct subtraction)  Language: Repeat: 2  Language: Fluency: 1 (12 words)  Abstraction: 1  Delayed Recall: 5  Orientation: 6  Add 1 Point if </=12 yr Education: 1 ()  MOCA Total Score: 25              Component  Ref Range & Units 3 mo ago  (10/29/24) 2 yr ago  (2/14/23) 2 yr ago  (5/17/22) 3 yr ago  (4/29/21) 4 yr ago  (12/18/20) 6 yr ago  (11/20/18) 6 yr ago  (6/13/18)   WBC  4.4 - 11.3 x10*3/uL 3.8 Low  5.5 R 5.0 R 5.6 R 6.2 R 4.5 R 7.6 R    Hemoglobin  12.0 - 16.0 g/dL 12.8 13.0 13.1 12.6 12.9 13.3 13.3   Hematocrit  36.0 - 46.0 % 38.9 39.7 38.4 36.4 39.2 38.6 39.1   MCV  80 - 100 fL 94 92 92 87 92 89 88   RDW  11.5 - 14.5 % 13.0 12.4 12.9 12.6 12.8 12.4 12.3   Platelets  150 - 450 x10*3/uL 258 248 R 245 R 238 R 284 R 257 R 250 R                  Component  Ref Range & Units 3 mo ago  (10/29/24) 7 mo ago  (7/10/24) 1 yr ago  (1/10/24) 1 yr ago  (9/6/23) 2 yr ago  (2/14/23) 2 yr ago  (9/27/22) 2 yr ago  (5/17/22)   Glucose  74 - 99 mg/dL 153 High  121 High  175 High  155 High  144 High  150 High  125 High    Sodium  136 - 145 mmol/L 143 143 142 144 144 142 142   Potassium  3.5 - 5.3 mmol/L 4.1 4.1 4.0 4.0 4.1 3.8 3.8   Chloride  98 - 107 mmol/L 107 107 106 109 High  107 105 107   Bicarbonate  21 - 32 mmol/L 26 27 26 25 27 29 27   Anion Gap  10 - 20 mmol/L 14 13 14 14 14 12 12   Urea Nitrogen  6 - 23 mg/dL 16 20 15 20 21 21 22   Creatinine  0.50 - 1.05 mg/dL 1.56 High  1.24 High  1.49 High  1.48 High  1.47 High  1.70 High  1.43 High    eGFR  >60 mL/min/1.73m*2 32 Low  42 Low  CM 34 Low  CM       Comment: Calculations of estimated GFR are performed using the 2021 CKD-EPI Study Refit equation without the race variable for the IDMS-Traceable creatinine methods.  https://jasn.asnjournals.org/content/early/2021/09/22/ASN.3741660988   Calcium  8.6 - 10.6 mg/dL 9.6 9.8 10.2 10.2 10.1 10.5 9.9   Phosphorus  2.5 - 4.9 mg/dL 3.9    3.7 CM     Albumin  3.4 - 5.0 g/dL 4.4    4.5                 Component  Ref Range & Units 3 mo ago  (10/29/24) 7 mo ago  (7/10/24) 1 yr ago  (1/10/24) 1 yr ago  (9/6/23) 2 yr ago  (2/14/23) 2 yr ago  (9/27/22) 2 yr ago  (5/17/22)   Hemoglobin A1C  See comment % 7.2 High  7.2 High  R 7.6 High  R 7.6 Abnormal  R, CM 7.8 Abnormal  R, CM 8.1 Abnormal  R, CM 8.0 Abnormal  R, CM   Estimated Average Glucose  Not Established mg/dL 160 160 171 171 R 177 R 186 R 183           Component  Ref Range & Units 10 mo ago 4 yr ago 6 yr ago  "  Cholesterol  0 - 199 mg/dL 124 121  CM   HDL-Cholesterol  mg/dL 57.4 44.1 CM 53.3 CM   Cholesterol/HDL Ratio 2.2 2.7 CM 2.3 CM   LDL Calculated  <=99 mg/dL 51 52 R, CM 55 R, CM   VLDL  0 - 40 mg/dL 16 25 16   Triglycerides  0 - 149 mg/dL 78 126 CM 82 CM       Lab Results   Component Value Date    TSH 0.99 12/18/2020    TSH 0.73 11/20/2018     No results found for: \"FREET4\"  No results found for: \"ZDNQWIFU65\"  Lab Results   Component Value Date    HGBA1C 7.2 (H) 10/29/2024    HGBA1C 7.2 (H) 07/10/2024    HGBA1C 7.6 (H) 01/10/2024     Lab Results   Component Value Date    VITD25 34 10/29/2024    VITD25 31 01/10/2024    VITD25 34 09/27/2022        No results found for this or any previous visit from the past 365 days.     CT head wo IV contrast 08/10/2024  CT cervical spine wo IV contrast 08/10/2024  CT maxillofacial bones wo IV contrast 08/10/2024  CT 3D reconstruction 08/10/2024  Impression  No acute intracranial abnormality.  No acute facial bone fracture. Soft tissue swelling/contusion of the chin  No acute fracture or traumatic subluxation of the cervical spine.    Assessment/Plan   1. Type 2 diabetes mellitus   A1c was 8.3 in 12/2020. we increased her glipizide from 5mg daily to BID in 3/19/21. A1c in 6/2021 was 8.9. at that time, we increased glipizide 5mg to 1 tab in morning and 10mg at night. A1c was 7.2 in 7/2024. But was told to increase glipizide to 7.5mg in morning and keep 10mg at night. A1c 7.2 in 10/2024. She started farxiga 5mg in early 12/2024 and decreased glipizide to just 5mg before dinner by clinical pharmacist. Having less symptomatic low sugar episodes since then. Though doesn't check blood sugars. Will check A1c today.   - follows with podiatry  - follows with ophthalmology. Notes that she was getting injections into her eyes but stopped. She is not sure what the injections were for. Does continue regular follow up    2. Andrea loss  Weight down 10 lbs in past year but down 14 lbs " compared to last August. Reports decreased appetite. Will monitor weight. Advised increased po intake. Suggested trying to add a supplement    3.  Vascular cognitive impairment   - had left leg weakness in 2014. 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 started on atorvastatin 10mg, plavix. denies any residual weakness. lipid panel 4/2024 showed good control.  she has strong family history of dementia including her mother. grandtr had noted some forgetfulness and repeating in the past. she has exhibited executive dysfunction: trouble learning her glucometer, despite having 2 office visits with me to review this. There also have been concerns about medication compliance. Still may be a little more in pill bottles than should be.  (Has said in past that she gets meds sooner than she needs them because of automatic shipped, which she has now stopped). Did know changes in meds made by pharmacist. she scored 22/30 on MoCA in 6/2021, 24/30 in 5/2022, and 20/30 in 2/2023. MoCA version 7.2 was 25/30 today with deficits in just visuospatial/executive functioning and subtraction.  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 though does have health iliteracy as well. Will monitor      4.osteoarthritis multiple joints  Having more pain lately in different places (knees, hands, backs) due to helping her dtr move. Taking tylenol 500mg BID regularly. And using lidocaine patches for muscle aches. Has been waking up at night due to tingling in her hands. L>R. Been using special gloves. Which helps. Will monitor     5. constipation/bloating  bowels moving well with increased fluids. Only using miralax as needed. Will continue     6. HTN  7. CKD  not checking at home. on amlodipine 2.5mg- 3 tabs (7.5mg) daily. bp appropriate. Denies lightheadedness. she does not want to take 5mg 1.5 tab daily. thought about switching her to an ARB instead of amlodipine but she had angioedema to lisinopril  "in the past. Creatinine was 1.4-1.7 over last 2 years. Cr 1.56 in 10/2024. Started her on farxiga since last visit. Will repeat BMP      8. insomnia  - sleep a little worse lately. When staying with dtr. Was waking up with hand tingling. Improved with gloves. Gonna go back to midnite supplement as needed     9. vitamin D deficiency   10. Osteoporosis  - Dxa in 2013 showed T of -2.0 in Femoral neck, -1.4 in AP spine, and -4.4 in lateral spine. previously had been -1.5 in 2007 in fem neck and -3.6 in Lat spine. she denies being on medicine for osteoporosis in past and don't see one on her list. she has lost height 4 inches. had dexa done 5/28/21. T score actually improved in the hip to -1.7 and density normal in lumbar spine but likely not accurate due to arthritis. Pt wasn't interested in a med for osteoporosis in past. Will repeat DeXA and considering medication at next visit.   - D level was 16 in 12/2020. D level was 34 in 10/2024. Is on D3 2000 units daily.      11. Hearing loss  -had hearing evaluation by audiology and ENT in past. has bilateral sensorineural hearing loss with slight asymmetry but not significant. she had repeat audiology exam 3/2022 and it was stable compared to the year prior. Was told she was good candidate for hearing aids. But didn't want to get them due to cost. Granddtr taking her to [facility] hearing center.     12. Hyperlipidemia  LDL was 51 in 4/2024 on atorvastatin 10mg daily. Will continue on this for now.       13. health maintenance   doesn't do vaccines. doesn't want to get the covid vaccines because she says she has \"so many allergies\".  In past, discussed in 5/2022- reviewed Dr. Mcduffie's Allergy assessment and recommendation to receive vaccination but pt still declined  she does not want further mammograms   - says she has done HCPOA and Living will in past. Says her senior facility has copies. Notes that her dtr Eyal Lance is her surrogate decision maker. And wishes to be " [id number]. Had discussion about it today and completed [place] form    Justine Baumann MD

## 2025-02-25 ENCOUNTER — CLINICAL SUPPORT (OUTPATIENT)
Dept: GERIATRIC MEDICINE | Facility: CLINIC | Age: 85
End: 2025-02-25
Payer: MEDICARE

## 2025-02-25 ENCOUNTER — OFFICE VISIT (OUTPATIENT)
Dept: GERIATRIC MEDICINE | Facility: CLINIC | Age: 85
End: 2025-02-25
Payer: MEDICARE

## 2025-02-25 VITALS
TEMPERATURE: 95.7 F | WEIGHT: 126.7 LBS | DIASTOLIC BLOOD PRESSURE: 71 MMHG | HEART RATE: 98 BPM | BODY MASS INDEX: 21.75 KG/M2 | SYSTOLIC BLOOD PRESSURE: 115 MMHG

## 2025-02-25 DIAGNOSIS — E55.9 VITAMIN D INSUFFICIENCY: ICD-10-CM

## 2025-02-25 DIAGNOSIS — Z00.00 HEALTHCARE MAINTENANCE: ICD-10-CM

## 2025-02-25 DIAGNOSIS — E11.22 TYPE 2 DIABETES MELLITUS WITH STAGE 3B CHRONIC KIDNEY DISEASE, WITHOUT LONG-TERM CURRENT USE OF INSULIN (MULTI): ICD-10-CM

## 2025-02-25 DIAGNOSIS — E78.2 MIXED HYPERLIPIDEMIA: ICD-10-CM

## 2025-02-25 DIAGNOSIS — H90.3 SENSORINEURAL HEARING LOSS (SNHL) OF BOTH EARS: ICD-10-CM

## 2025-02-25 DIAGNOSIS — M81.0 AGE-RELATED OSTEOPOROSIS WITHOUT CURRENT PATHOLOGICAL FRACTURE: ICD-10-CM

## 2025-02-25 DIAGNOSIS — R41.89 COGNITIVE IMPAIRMENT: ICD-10-CM

## 2025-02-25 DIAGNOSIS — G47.00 INSOMNIA, UNSPECIFIED TYPE: ICD-10-CM

## 2025-02-25 DIAGNOSIS — N18.32 TYPE 2 DIABETES MELLITUS WITH STAGE 3B CHRONIC KIDNEY DISEASE, WITHOUT LONG-TERM CURRENT USE OF INSULIN (MULTI): Primary | ICD-10-CM

## 2025-02-25 DIAGNOSIS — E11.22 TYPE 2 DIABETES MELLITUS WITH STAGE 3B CHRONIC KIDNEY DISEASE, WITHOUT LONG-TERM CURRENT USE OF INSULIN (MULTI): Primary | ICD-10-CM

## 2025-02-25 DIAGNOSIS — M15.0 PRIMARY OSTEOARTHRITIS INVOLVING MULTIPLE JOINTS: ICD-10-CM

## 2025-02-25 DIAGNOSIS — R63.4 WEIGHT LOSS: ICD-10-CM

## 2025-02-25 DIAGNOSIS — K59.00 CONSTIPATION, UNSPECIFIED CONSTIPATION TYPE: ICD-10-CM

## 2025-02-25 DIAGNOSIS — I10 PRIMARY HYPERTENSION: ICD-10-CM

## 2025-02-25 DIAGNOSIS — N18.9 CHRONIC KIDNEY DISEASE, UNSPECIFIED CKD STAGE: ICD-10-CM

## 2025-02-25 DIAGNOSIS — Z86.73 HISTORY OF STROKE: ICD-10-CM

## 2025-02-25 DIAGNOSIS — N18.32 TYPE 2 DIABETES MELLITUS WITH STAGE 3B CHRONIC KIDNEY DISEASE, WITHOUT LONG-TERM CURRENT USE OF INSULIN (MULTI): ICD-10-CM

## 2025-02-25 PROCEDURE — 1125F AMNT PAIN NOTED PAIN PRSNT: CPT | Performed by: INTERNAL MEDICINE

## 2025-02-25 PROCEDURE — 3078F DIAST BP <80 MM HG: CPT | Performed by: INTERNAL MEDICINE

## 2025-02-25 PROCEDURE — 99215 OFFICE O/P EST HI 40 MIN: CPT | Performed by: INTERNAL MEDICINE

## 2025-02-25 PROCEDURE — 3074F SYST BP LT 130 MM HG: CPT | Performed by: INTERNAL MEDICINE

## 2025-02-25 PROCEDURE — 1159F MED LIST DOCD IN RCRD: CPT | Performed by: INTERNAL MEDICINE

## 2025-02-25 PROCEDURE — 1036F TOBACCO NON-USER: CPT | Performed by: INTERNAL MEDICINE

## 2025-02-25 PROCEDURE — G0439 PPPS, SUBSEQ VISIT: HCPCS | Performed by: INTERNAL MEDICINE

## 2025-02-25 PROCEDURE — G2211 COMPLEX E/M VISIT ADD ON: HCPCS | Performed by: INTERNAL MEDICINE

## 2025-02-25 RX ORDER — POLYETHYLENE GLYCOL 3350 17 G/17G
17 POWDER, FOR SOLUTION ORAL DAILY PRN
COMMUNITY

## 2025-02-25 RX ORDER — CHOLECALCIFEROL (VITAMIN D3) 50 MCG
50 TABLET ORAL DAILY
COMMUNITY

## 2025-02-25 RX ORDER — ACETAMINOPHEN 500 MG
500 TABLET ORAL EVERY 8 HOURS PRN
COMMUNITY
Start: 2025-02-25

## 2025-02-25 ASSESSMENT — MONTREAL COGNITIVE ASSESSMENT (MOCA)
8. SERIAL SUBTRACTION OF 7S: 1
WHAT IS THE TOTAL SCORE (OUT OF 30): 25
6. READ LIST OF DIGITS [FORWARD/BACKWARD]: 2
7. [VIGILENCE] TAP WHEN HEARING DESIGNATED LETTER: 1
5. MEMORY TRIALS: 0
WHAT LEVEL OF EDUCATION WAS ATTAINED: 1
13. ORIENTATION SUBSCORE: 6
12. MEMORY INDEX SCORE: 5
4. NAME EACH OF THE THREE ANIMALS SHOWN: 3
10. [FLUENCY] NAME WORDS STARTING WITH DESIGNATED LETTER: 1
9. REPEAT EACH SENTENCE: 2
11. FOR EACH PAIR OF WORDS, WHAT CATEGORY DO THEY BELONG TO (OUT OF 2): 1
VISUOSPATIAL/EXECUTIVE SUBSCORE: 2

## 2025-02-25 ASSESSMENT — ENCOUNTER SYMPTOMS
DEPRESSION: 0
OCCASIONAL FEELINGS OF UNSTEADINESS: 0
LOSS OF SENSATION IN FEET: 0

## 2025-02-25 ASSESSMENT — PAIN SCALES - GENERAL: PAINLEVEL_OUTOF10: 7

## 2025-02-25 ASSESSMENT — PATIENT HEALTH QUESTIONNAIRE - PHQ9
1. LITTLE INTEREST OR PLEASURE IN DOING THINGS: NOT AT ALL
2. FEELING DOWN, DEPRESSED OR HOPELESS: NOT AT ALL
SUM OF ALL RESPONSES TO PHQ9 QUESTIONS 1 AND 2: 0

## 2025-02-25 NOTE — PATIENT INSTRUCTIONS
Do not lose any more weight    2. Agree with cooking more for yourself    3. Try to find a supplement  - ensure clear, boost breeze, carnation instant breakfast, premier protein     4. We kendall blood today to check kidneys and hemoglobin A1c (blood sugars)    5. Schedule bone density     6. We completed the [place] DNR form   - you wish to be DNR arrest     7. Follow up in 6 months    09-Mar-2023 13:43

## 2025-02-26 LAB
ALBUMIN SERPL-MCNC: 4.5 G/DL (ref 3.6–5.1)
BUN SERPL-MCNC: 15 MG/DL (ref 7–25)
BUN/CREAT SERPL: 10 (CALC) (ref 6–22)
CALCIUM SERPL-MCNC: 9.8 MG/DL (ref 8.6–10.4)
CHLORIDE SERPL-SCNC: 108 MMOL/L (ref 98–110)
CO2 SERPL-SCNC: 26 MMOL/L (ref 20–32)
CREAT SERPL-MCNC: 1.44 MG/DL (ref 0.6–0.95)
EGFRCR SERPLBLD CKD-EPI 2021: 35 ML/MIN/1.73M2
EST. AVERAGE GLUCOSE BLD GHB EST-MCNC: 157 MG/DL
EST. AVERAGE GLUCOSE BLD GHB EST-SCNC: 8.7 MMOL/L
GLUCOSE SERPL-MCNC: 151 MG/DL (ref 65–99)
HBA1C MFR BLD: 7.1 % OF TOTAL HGB
PHOSPHATE SERPL-MCNC: 3.9 MG/DL (ref 2.1–4.3)
POTASSIUM SERPL-SCNC: 4.2 MMOL/L (ref 3.5–5.3)
SODIUM SERPL-SCNC: 143 MMOL/L (ref 135–146)

## 2025-03-05 ENCOUNTER — APPOINTMENT (OUTPATIENT)
Dept: PHARMACY | Facility: HOSPITAL | Age: 85
End: 2025-03-05
Payer: MEDICARE

## 2025-03-05 DIAGNOSIS — E11.9 TYPE 2 DIABETES MELLITUS WITHOUT COMPLICATION, UNSPECIFIED WHETHER LONG TERM INSULIN USE: Primary | ICD-10-CM

## 2025-03-05 DIAGNOSIS — H90.3 SENSORINEURAL HEARING LOSS (SNHL) OF BOTH EARS: ICD-10-CM

## 2025-03-05 NOTE — PROGRESS NOTES
Outpatient Geriatrics Clinical Pharmacy Visit  Tanya Lance is a 84 y.o. female who was referred to the ambulatory Clinical Pharmacy Team for their Diabetes.    Referring Provider: Justine Baumann MD  Next appointment: 8/26/25    Medication Access  Cost barriers: None, no copays on existing medications  Enrolled in  PAP: Yes, expires 11/25/25 (Farxiga)  Manages own medication: No  Transportation to the pharmacy: N/A, mail-order  Frequency of missed doses: Rare; Takes medication 7/7 days each week     Renal/Hepatic Dosing  Current GFR (reported): 32 mL/min/1.73 m2 as of 10/29/24  Current CrCl (calculated): 22.4 mL/min as of 10/29/24 labs  History of cirrhosis or hepatic dysfunction? No, however, positive history for hepatitis B. Family history of hepatic failure.      History of Care  11/08/24- Establishing with Clinical Pharmacy, started  PAP application for Farxiga  11/25/24-  PAP application for Farxiga approved  12/07/24- Patient started Farxiga 5 mg daily and decreased glipizide IR to 5 mg QPM only     Last Visit/Interim  At last visit, continued glipizide IR 5 mg 30 minutes before dinner and continued Farxiga 5 mg daily.     In the interim, patient saw PCP Dr. Baumann for a follow-up visit. She reports that she had lost about 14 lbs since last visit with PCP. A1c also resulted at 7.1% on 2/25/25. Per written communication with PCP, missing some doses at night and okay to stop glipizide per results of this most recent A1c.      DIABETES ASSESSMENT   Tanya estimates that she was diagnosed with Type 2 Diabetes in 2015.     Today, Tanya reports that she continues to do well on the Farxiga. At most recent PCP visit, she states she was encouraged to eat more. Has been grocery shopping and has 'stocked up' on grocereis- picked up some Ensure and snacks, took meet out of the freezer yesterday for dinner today.     Continues to deny hypoglycemic events.     Current Diabetes Medication Regimen    Farxiga 5 mg  "daily     Secondary Prevention  Statin? No  ACE-I/ARB? No  Aspirin? 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\", as of 11/1/24 visit  PMH/FH of Medullary Thyroid Carcinoma or Multiple Endocrine Neoplasia Type 2: No  PMH of Urinary Tract Infections: No     Health Maintenance:   Foot Exam: Sees podiatrist regularly ([facility]); goes every 3 months  Eye Exam: Up to date; recently saw retina specialist 11/1/24  Lipid Panel: LDL 51 mg/dL and triglycerides 78 mg/dL   Urine Albumin/Creatinine Ratio: No labs     DIET:   Wake up (4am or later, 8am if taking MidNite supplement the night before)  Breakfast (10am-12am): Bowl of cheerios, yogurt, scrambled egg, toast, rarely farmer, coffee, banana  Lunch (earliest 1am, latest 3pm): Turkey or chicken sandwich (deli meat), soup+salad, peanut butter and jelly, milk (oat milk)  Dinner (no later than 8pm): Grilled chicken, yukon gold potatoes, salad, green beans, fresh spinach, sometimes a ground chicken meatloaf with rice, applesauce, sometimes spaghetti   Bedtime (varies, will go to sleep watching TV and wake up later) If taking MidNite supplement around 10 pm, will \"sleep like a baby\"     Won't take morning medications until she eats, sometimes will drink a cup of coffee and will take blood pressure pill     EXERCISE: Unable to discuss due to time     Current monitoring regimen:   Patient is using: No devices  Testing frequency: Never; relies only on A1c.   Barriers to testing: Yes. Patient does not routinely check her blood sugar due to both frustration with testing and due to the process bringing back memories of her son, also diagnosed with diabetes, who has since passed away.      Patient-Reported Blood Glucose:  No blood sugar to report due to above barriers.      Has the patient experienced any low sugars since last contact? No  Denies some symptoms of " low blood glucose  Has the patient experienced any high sugars since last contact? No  denies symptoms of high blood glucose    Laboratory Results  Lab Results   Component Value Date    BILITOT 0.5 04/29/2021    CALCIUM 9.8 02/25/2025    CO2 26 02/25/2025     02/25/2025    CREATININE 1.44 (H) 02/25/2025    GLUCOSE 151 (H) 02/25/2025    ALKPHOS 86 04/29/2021    K 4.2 02/25/2025    PROT 6.5 04/29/2021     02/25/2025    AST 16 04/29/2021    ALT 15 04/29/2021    BUN 15 02/25/2025    ANIONGAP 14 10/29/2024    PHOS 3.9 02/25/2025    ALBUMIN 4.5 02/25/2025    AMYLASE 98 04/29/2021    LIPASE 30 04/29/2021    GFRF 34 (A) 09/06/2023    EGFR 35 (L) 02/25/2025     Lab Results   Component Value Date    TRIG 78 04/10/2024    CHOL 124 04/10/2024    LDLCALC 51 04/10/2024    HDL 57.4 04/10/2024     Lab Results   Component Value Date    HGBA1C 7.1 (H) 02/25/2025    HGBA1C 7.2 (H) 10/29/2024    HGBA1C 7.2 (H) 07/10/2024       ASCVD Risk  The ASCVD Risk score (Basia ELIZABETH, et al., 2019) failed to calculate for the following reasons:    The 2019 ASCVD risk score is only valid for ages 40 to 79    Risk score cannot be calculated because patient has a medical history suggesting prior/existing ASCVD      Assessment/Plan   Problem List Items Addressed This Visit       Bilateral hearing loss    Relevant Orders    Referral to Clinical Pharmacy    DM w/o complication type II - Primary    Relevant Orders    Referral to Clinical Pharmacy     Assessment  Tanya's diabetes is well-controlled for her age, as evidenced by recent A1c of 7.1% on 2/25/25 (goal A1c <8% per age).   Furthermore, she continues to no longer observe any hypoglycemic episodes after reduction of glipizide and initiation of Farxiga 5 mg daily.   Per above previous discussions with PCP (see 'interim' notation), as Tanya may be frequently missing her evening glipizide dosing AND as she is still far below upper limit of her A1c goal, okay to discontinue glipizide at this  time. Patient is amenable to this changes and voices her understanding.     Plan/Changes   Continue all meds under the continuation of care with the referring provider and clinical pharmacy team  Stop glipizide  Continue Farxiga 5 mg daily    Next Clinical Pharmacy Follow-Up  6/5/25  General check-in, review A1c      Latasha Caruso, Kush     Verbal consent to manage patient's drug therapy was obtained from the patient. They were informed they may decline to participate or withdraw from participation in pharmacy services at any time.

## 2025-03-05 NOTE — Clinical Note
I sincerely apologize for the delay in getting this documentation to you. Per previous discussions, Tanya had been instructed to stop glipizide due to frequently missing her dose and due to A1c within acceptable range at 7.1% (Goal <8%). Follow-up in June.

## 2025-03-17 DIAGNOSIS — N18.32 TYPE 2 DIABETES MELLITUS WITH STAGE 3B CHRONIC KIDNEY DISEASE, WITHOUT LONG-TERM CURRENT USE OF INSULIN (MULTI): ICD-10-CM

## 2025-03-17 DIAGNOSIS — E11.22 TYPE 2 DIABETES MELLITUS WITH STAGE 3B CHRONIC KIDNEY DISEASE, WITHOUT LONG-TERM CURRENT USE OF INSULIN (MULTI): ICD-10-CM

## 2025-03-17 PROCEDURE — RXMED WILLOW AMBULATORY MEDICATION CHARGE

## 2025-03-17 RX ORDER — DAPAGLIFLOZIN 5 MG/1
5 TABLET, FILM COATED ORAL DAILY
Qty: 30 TABLET | Refills: 0 | Status: SHIPPED | OUTPATIENT
Start: 2025-03-17 | End: 2025-04-19

## 2025-03-20 ENCOUNTER — PHARMACY VISIT (OUTPATIENT)
Dept: PHARMACY | Facility: CLINIC | Age: 85
End: 2025-03-20
Payer: COMMERCIAL

## 2025-04-16 DIAGNOSIS — E11.22 TYPE 2 DIABETES MELLITUS WITH STAGE 3B CHRONIC KIDNEY DISEASE, WITHOUT LONG-TERM CURRENT USE OF INSULIN (MULTI): ICD-10-CM

## 2025-04-16 DIAGNOSIS — N18.32 TYPE 2 DIABETES MELLITUS WITH STAGE 3B CHRONIC KIDNEY DISEASE, WITHOUT LONG-TERM CURRENT USE OF INSULIN (MULTI): ICD-10-CM

## 2025-04-16 PROCEDURE — RXMED WILLOW AMBULATORY MEDICATION CHARGE

## 2025-04-16 RX ORDER — DAPAGLIFLOZIN 5 MG/1
5 TABLET, FILM COATED ORAL DAILY
Qty: 30 TABLET | Refills: 0 | Status: SHIPPED | OUTPATIENT
Start: 2025-04-16 | End: 2025-05-17

## 2025-04-17 ENCOUNTER — PHARMACY VISIT (OUTPATIENT)
Dept: PHARMACY | Facility: CLINIC | Age: 85
End: 2025-04-17
Payer: COMMERCIAL

## 2025-05-15 DIAGNOSIS — N18.32 TYPE 2 DIABETES MELLITUS WITH STAGE 3B CHRONIC KIDNEY DISEASE, WITHOUT LONG-TERM CURRENT USE OF INSULIN (MULTI): ICD-10-CM

## 2025-05-15 DIAGNOSIS — E11.22 TYPE 2 DIABETES MELLITUS WITH STAGE 3B CHRONIC KIDNEY DISEASE, WITHOUT LONG-TERM CURRENT USE OF INSULIN (MULTI): ICD-10-CM

## 2025-05-19 PROCEDURE — RXMED WILLOW AMBULATORY MEDICATION CHARGE

## 2025-05-19 RX ORDER — DAPAGLIFLOZIN 5 MG/1
5 TABLET, FILM COATED ORAL DAILY
Qty: 30 TABLET | Refills: 0 | Status: SHIPPED | OUTPATIENT
Start: 2025-05-19 | End: 2025-06-21

## 2025-05-22 ENCOUNTER — PHARMACY VISIT (OUTPATIENT)
Dept: PHARMACY | Facility: CLINIC | Age: 85
End: 2025-05-22
Payer: COMMERCIAL

## 2025-05-24 DIAGNOSIS — Z86.73 HISTORY OF CVA (CEREBROVASCULAR ACCIDENT): ICD-10-CM

## 2025-05-27 RX ORDER — CLOPIDOGREL BISULFATE 75 MG/1
75 TABLET ORAL DAILY
Qty: 100 TABLET | Refills: 2 | Status: SHIPPED | OUTPATIENT
Start: 2025-05-27

## 2025-06-05 ENCOUNTER — APPOINTMENT (OUTPATIENT)
Dept: PHARMACY | Facility: HOSPITAL | Age: 85
End: 2025-06-05
Payer: MEDICARE

## 2025-06-05 DIAGNOSIS — H90.3 SENSORINEURAL HEARING LOSS (SNHL) OF BOTH EARS: ICD-10-CM

## 2025-06-05 DIAGNOSIS — E11.9 TYPE 2 DIABETES MELLITUS WITHOUT COMPLICATION, UNSPECIFIED WHETHER LONG TERM INSULIN USE: Primary | ICD-10-CM

## 2025-06-05 NOTE — Clinical Note
Overall continuing to do very well on Farxiga- would likely benefit from increasing to 10 mg daily for renal benefit, but will wait until after next A1c results (likely 8/2025).

## 2025-06-05 NOTE — PROGRESS NOTES
"Outpatient Geriatrics Clinical Pharmacy Visit  Tanya Lance is a 85 y.o. female who was referred to the ambulatory Clinical Pharmacy Team for their Diabetes.    Referring Provider: Justine Baumann MD  Next appointment: 8/26/25    Medication Access  Cost barriers: None, no copays on existing medications  Enrolled in  PAP: Yes, expires 11/25/25 (Farxiga)  Manages own medication: No  Transportation to the pharmacy: N/A, mail-order  Frequency of missed doses: Rare; Takes medication 7/7 days each week     Last Visit/Interim  At last visit, stopped IR glipizide.      No updates in the interim.      DIABETES ASSESSMENT   Tanya estimates that she was diagnosed with Type 2 Diabetes in 2015.      Today, Tanya notes continued tolerability to Farxiga and denies any pain/burning with urination or other skin infections. She confirms drinking adequate amounts of water. She cites no more bloating or constipation since starting Farxiga.     She is exercising 3-5 times weekly, for 30 minutes per session.    She has started cooking more due to an increased appetite, and enjoys going to farmer's markets to source fresh meat/vegetables.     Current Diabetes Medication Regimen    Farxiga 5 mg daily     Secondary Prevention  Statin? No  ACE-I/ARB? No  Aspirin? 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\", as of 11/1/24 visit  PMH/FH of Medullary Thyroid Carcinoma or Multiple Endocrine Neoplasia Type 2: No  PMH of Urinary Tract Infections: No     Health Maintenance:   Foot Exam: Sees podiatrist regularly ([facility]); goes every 3 months  Eye Exam: Up to date; recently saw retina specialist 11/1/24  Lipid Panel: LDL 51 mg/dL and triglycerides 78 mg/dL as of 4/11/24  Urine Albumin/Creatinine Ratio: No labs     DIET:   Wake up (4am or later, 8am if taking MidNite supplement the night before)  Breakfast (10am-12am): " "Bowl of cheerios, yogurt, scrambled egg, toast, rarely farmer, coffee, banana  Lunch (earliest 1am, latest 3pm): Turkey or chicken sandwich (deli meat), soup+salad, peanut butter and jelly, milk (oat milk)  Dinner (no later than 8pm): Grilled chicken, yukon gold potatoes, salad, green beans, fresh spinach, sometimes a ground chicken meatloaf with rice, applesauce, sometimes spaghetti   Bedtime (varies, will go to sleep watching TV and wake up later) If taking MidNite supplement around 10 pm, will \"sleep like a baby\"     Won't take morning medications until she eats, sometimes will drink a cup of coffee and will take blood pressure pill     EXERCISE: Unable to discuss due to time     Current monitoring regimen:   Patient is using: No devices  Testing frequency: Never; relies only on A1c.   Barriers to testing: Yes. Patient does not routinely check her blood sugar due to both frustration with testing and due to the process bringing back memories of her son, also diagnosed with diabetes, who has since passed away.      Patient-Reported Blood Glucose:  No blood sugar to report due to above barriers.      Has the patient experienced any low sugars since last contact? No  Denies some symptoms of low blood glucose  Has the patient experienced any high sugars since last contact? No  denies symptoms of high blood glucose    Laboratory Results  Lab Results   Component Value Date    BILITOT 0.5 04/29/2021    CALCIUM 9.8 02/25/2025    CO2 26 02/25/2025     02/25/2025    CREATININE 1.44 (H) 02/25/2025    GLUCOSE 151 (H) 02/25/2025    ALKPHOS 86 04/29/2021    K 4.2 02/25/2025    PROT 6.5 04/29/2021     02/25/2025    AST 16 04/29/2021    ALT 15 04/29/2021    BUN 15 02/25/2025    ANIONGAP 14 10/29/2024    PHOS 3.9 02/25/2025    ALBUMIN 4.5 02/25/2025    AMYLASE 98 04/29/2021    LIPASE 30 04/29/2021    GFRF 34 (A) 09/06/2023    EGFR 35 (L) 02/25/2025     Lab Results   Component Value Date    TRIG 78 04/10/2024    CHOL 124 " 04/10/2024    LDLCALC 51 04/10/2024    HDL 57.4 04/10/2024     Lab Results   Component Value Date    HGBA1C 7.1 (H) 02/25/2025    HGBA1C 7.2 (H) 10/29/2024    HGBA1C 7.2 (H) 07/10/2024       ASCVD Risk  The ASCVD Risk score (Basia ELIZABETH, et al., 2019) failed to calculate for the following reasons:    The 2019 ASCVD risk score is only valid for ages 40 to 79    Risk score cannot be calculated because patient has a medical history suggesting prior/existing ASCVD      Assessment/Plan   Problem List Items Addressed This Visit       Bilateral hearing loss    DM w/o complication type II - Primary    Relevant Orders    Referral to Clinical Pharmacy     Assessment  Tanya's diabetes is well-controlled for her age, as evidenced by recent A1c of 7.1% on 2/25/25 (goal A1c <8% per age).   She continues to tolerate well, and renally, would benefit from increased dosing of Farxiga to 10 mg daily. Will wait for next A1c to result, as patient is not checking her blood glucose on a regular basis.    Plan/Changes   Continue all meds under the continuation of care with the referring provider and clinical pharmacy team  Continue Farxiga 5 mg daily    Next Clinical Pharmacy Follow-Up  ~3 months  Assess for increased Farxiga dosing      Latasha Caruso PharmD     Verbal consent to manage patient's drug therapy was obtained from the patient. They were informed they may decline to participate or withdraw from participation in pharmacy services at any time.

## 2025-06-08 DIAGNOSIS — I10 PRIMARY HYPERTENSION: ICD-10-CM

## 2025-06-09 RX ORDER — AMLODIPINE BESYLATE 2.5 MG/1
7.5 TABLET ORAL DAILY
Qty: 270 TABLET | Refills: 3 | Status: SHIPPED | OUTPATIENT
Start: 2025-06-09

## 2025-06-14 DIAGNOSIS — N18.32 TYPE 2 DIABETES MELLITUS WITH STAGE 3B CHRONIC KIDNEY DISEASE, WITHOUT LONG-TERM CURRENT USE OF INSULIN (MULTI): ICD-10-CM

## 2025-06-14 DIAGNOSIS — E11.22 TYPE 2 DIABETES MELLITUS WITH STAGE 3B CHRONIC KIDNEY DISEASE, WITHOUT LONG-TERM CURRENT USE OF INSULIN (MULTI): ICD-10-CM

## 2025-06-16 PROCEDURE — RXMED WILLOW AMBULATORY MEDICATION CHARGE

## 2025-06-16 RX ORDER — DAPAGLIFLOZIN 5 MG/1
5 TABLET, FILM COATED ORAL DAILY
Qty: 30 TABLET | Refills: 0 | Status: SHIPPED | OUTPATIENT
Start: 2025-06-16 | End: 2025-07-19

## 2025-06-19 ENCOUNTER — PHARMACY VISIT (OUTPATIENT)
Dept: PHARMACY | Facility: CLINIC | Age: 85
End: 2025-06-19
Payer: COMMERCIAL

## 2025-07-14 DIAGNOSIS — E11.22 TYPE 2 DIABETES MELLITUS WITH STAGE 3B CHRONIC KIDNEY DISEASE, WITHOUT LONG-TERM CURRENT USE OF INSULIN (MULTI): ICD-10-CM

## 2025-07-14 DIAGNOSIS — N18.32 TYPE 2 DIABETES MELLITUS WITH STAGE 3B CHRONIC KIDNEY DISEASE, WITHOUT LONG-TERM CURRENT USE OF INSULIN (MULTI): ICD-10-CM

## 2025-07-15 PROCEDURE — RXMED WILLOW AMBULATORY MEDICATION CHARGE

## 2025-07-15 RX ORDER — DAPAGLIFLOZIN 5 MG/1
5 TABLET, FILM COATED ORAL DAILY
Qty: 30 TABLET | Refills: 0 | Status: SHIPPED | OUTPATIENT
Start: 2025-07-15 | End: 2025-08-16

## 2025-07-17 ENCOUNTER — PHARMACY VISIT (OUTPATIENT)
Dept: PHARMACY | Facility: CLINIC | Age: 85
End: 2025-07-17
Payer: COMMERCIAL

## 2025-08-13 DIAGNOSIS — N18.32 TYPE 2 DIABETES MELLITUS WITH STAGE 3B CHRONIC KIDNEY DISEASE, WITHOUT LONG-TERM CURRENT USE OF INSULIN (MULTI): ICD-10-CM

## 2025-08-13 DIAGNOSIS — E11.22 TYPE 2 DIABETES MELLITUS WITH STAGE 3B CHRONIC KIDNEY DISEASE, WITHOUT LONG-TERM CURRENT USE OF INSULIN (MULTI): ICD-10-CM

## 2025-08-13 RX ORDER — DAPAGLIFLOZIN 5 MG/1
5 TABLET, FILM COATED ORAL DAILY
Qty: 30 TABLET | Refills: 0 | Status: SHIPPED | OUTPATIENT
Start: 2025-08-13 | End: 2025-09-14

## 2025-08-14 PROCEDURE — RXMED WILLOW AMBULATORY MEDICATION CHARGE

## 2025-08-15 ENCOUNTER — PHARMACY VISIT (OUTPATIENT)
Dept: PHARMACY | Facility: CLINIC | Age: 85
End: 2025-08-15
Payer: COMMERCIAL

## 2025-08-26 ENCOUNTER — OFFICE VISIT (OUTPATIENT)
Dept: GERIATRIC MEDICINE | Facility: CLINIC | Age: 85
End: 2025-08-26
Payer: MEDICARE

## 2025-08-26 VITALS
DIASTOLIC BLOOD PRESSURE: 77 MMHG | WEIGHT: 125.3 LBS | TEMPERATURE: 96.3 F | SYSTOLIC BLOOD PRESSURE: 126 MMHG | BODY MASS INDEX: 21.51 KG/M2 | HEART RATE: 96 BPM

## 2025-08-26 DIAGNOSIS — I10 PRIMARY HYPERTENSION: ICD-10-CM

## 2025-08-26 DIAGNOSIS — M15.0 PRIMARY OSTEOARTHRITIS INVOLVING MULTIPLE JOINTS: ICD-10-CM

## 2025-08-26 DIAGNOSIS — R63.4 WEIGHT LOSS: ICD-10-CM

## 2025-08-26 DIAGNOSIS — Z00.00 HEALTHCARE MAINTENANCE: ICD-10-CM

## 2025-08-26 DIAGNOSIS — H90.3 SENSORINEURAL HEARING LOSS (SNHL) OF BOTH EARS: Primary | ICD-10-CM

## 2025-08-26 DIAGNOSIS — K59.00 CONSTIPATION, UNSPECIFIED CONSTIPATION TYPE: ICD-10-CM

## 2025-08-26 DIAGNOSIS — E55.9 VITAMIN D INSUFFICIENCY: ICD-10-CM

## 2025-08-26 DIAGNOSIS — M81.0 AGE-RELATED OSTEOPOROSIS WITHOUT CURRENT PATHOLOGICAL FRACTURE: ICD-10-CM

## 2025-08-26 DIAGNOSIS — G47.00 INSOMNIA, UNSPECIFIED TYPE: ICD-10-CM

## 2025-08-26 DIAGNOSIS — N18.32 TYPE 2 DIABETES MELLITUS WITH STAGE 3B CHRONIC KIDNEY DISEASE, WITHOUT LONG-TERM CURRENT USE OF INSULIN (MULTI): ICD-10-CM

## 2025-08-26 DIAGNOSIS — R41.89 COGNITIVE IMPAIRMENT: ICD-10-CM

## 2025-08-26 DIAGNOSIS — E11.3211 MILD NONPROLIFERATIVE DIABETIC RETINOPATHY OF RIGHT EYE WITH MACULAR EDEMA ASSOCIATED WITH TYPE 2 DIABETES MELLITUS: ICD-10-CM

## 2025-08-26 DIAGNOSIS — E11.22 TYPE 2 DIABETES MELLITUS WITH STAGE 3B CHRONIC KIDNEY DISEASE, WITHOUT LONG-TERM CURRENT USE OF INSULIN (MULTI): ICD-10-CM

## 2025-08-26 PROCEDURE — 99215 OFFICE O/P EST HI 40 MIN: CPT | Performed by: INTERNAL MEDICINE

## 2025-08-26 PROCEDURE — 1126F AMNT PAIN NOTED NONE PRSNT: CPT | Performed by: INTERNAL MEDICINE

## 2025-08-26 PROCEDURE — 3078F DIAST BP <80 MM HG: CPT | Performed by: INTERNAL MEDICINE

## 2025-08-26 PROCEDURE — 3074F SYST BP LT 130 MM HG: CPT | Performed by: INTERNAL MEDICINE

## 2025-08-26 PROCEDURE — 1159F MED LIST DOCD IN RCRD: CPT | Performed by: INTERNAL MEDICINE

## 2025-08-26 PROCEDURE — 36415 COLL VENOUS BLD VENIPUNCTURE: CPT | Performed by: INTERNAL MEDICINE

## 2025-08-26 PROCEDURE — G2211 COMPLEX E/M VISIT ADD ON: HCPCS | Performed by: INTERNAL MEDICINE

## 2025-08-26 ASSESSMENT — ENCOUNTER SYMPTOMS
OCCASIONAL FEELINGS OF UNSTEADINESS: 1
DEPRESSION: 0
LOSS OF SENSATION IN FEET: 0

## 2025-08-26 ASSESSMENT — PAIN SCALES - GENERAL: PAINLEVEL_OUTOF10: 0-NO PAIN

## 2025-08-27 LAB
ALBUMIN SERPL-MCNC: 4.5 G/DL (ref 3.6–5.1)
BUN SERPL-MCNC: 18 MG/DL (ref 7–25)
BUN/CREAT SERPL: 10 (CALC) (ref 6–22)
CALCIUM SERPL-MCNC: 9.8 MG/DL (ref 8.6–10.4)
CHLORIDE SERPL-SCNC: 105 MMOL/L (ref 98–110)
CO2 SERPL-SCNC: 24 MMOL/L (ref 20–32)
CREAT SERPL-MCNC: 1.73 MG/DL (ref 0.6–0.95)
EGFRCR SERPLBLD CKD-EPI 2021: 28 ML/MIN/1.73M2
ERYTHROCYTE [DISTWIDTH] IN BLOOD BY AUTOMATED COUNT: 12.6 % (ref 11–15)
EST. AVERAGE GLUCOSE BLD GHB EST-MCNC: 249 MG/DL
EST. AVERAGE GLUCOSE BLD GHB EST-SCNC: 13.8 MMOL/L
GLUCOSE SERPL-MCNC: 205 MG/DL (ref 65–99)
HBA1C MFR BLD: 10.3 %
HCT VFR BLD AUTO: 42.4 % (ref 35–45)
HGB BLD-MCNC: 14.2 G/DL (ref 11.7–15.5)
MCH RBC QN AUTO: 31.1 PG (ref 27–33)
MCHC RBC AUTO-ENTMCNC: 33.5 G/DL (ref 32–36)
MCV RBC AUTO: 92.8 FL (ref 80–100)
PHOSPHATE SERPL-MCNC: 4.3 MG/DL (ref 2.1–4.3)
PLATELET # BLD AUTO: 216 THOUSAND/UL (ref 140–400)
PMV BLD REES-ECKER: 10.1 FL (ref 7.5–12.5)
POTASSIUM SERPL-SCNC: 4.2 MMOL/L (ref 3.5–5.3)
RBC # BLD AUTO: 4.57 MILLION/UL (ref 3.8–5.1)
SODIUM SERPL-SCNC: 140 MMOL/L (ref 135–146)
VIT B12 SERPL-MCNC: 421 PG/ML (ref 200–1100)
WBC # BLD AUTO: 4.8 THOUSAND/UL (ref 3.8–10.8)

## 2025-09-05 ENCOUNTER — APPOINTMENT (OUTPATIENT)
Dept: PHARMACY | Facility: HOSPITAL | Age: 85
End: 2025-09-05
Payer: MEDICARE

## 2025-09-08 ENCOUNTER — APPOINTMENT (OUTPATIENT)
Dept: OPHTHALMOLOGY | Facility: CLINIC | Age: 85
End: 2025-09-08
Payer: MEDICARE

## 2025-10-03 ENCOUNTER — APPOINTMENT (OUTPATIENT)
Dept: PHARMACY | Facility: HOSPITAL | Age: 85
End: 2025-10-03
Payer: MEDICARE

## 2025-11-28 ENCOUNTER — APPOINTMENT (OUTPATIENT)
Dept: OTOLARYNGOLOGY | Facility: CLINIC | Age: 85
End: 2025-11-28
Payer: MEDICARE